# Patient Record
Sex: FEMALE | Race: NATIVE HAWAIIAN OR OTHER PACIFIC ISLANDER | NOT HISPANIC OR LATINO | URBAN - METROPOLITAN AREA
[De-identification: names, ages, dates, MRNs, and addresses within clinical notes are randomized per-mention and may not be internally consistent; named-entity substitution may affect disease eponyms.]

---

## 2018-08-29 ENCOUNTER — INPATIENT (INPATIENT)
Facility: HOSPITAL | Age: 68
LOS: 5 days | Discharge: HOME HEALTH | DRG: 216 | End: 2018-09-04
Attending: THORACIC SURGERY (CARDIOTHORACIC VASCULAR SURGERY) | Admitting: THORACIC SURGERY (CARDIOTHORACIC VASCULAR SURGERY)
Payer: MEDICARE

## 2018-08-29 VITALS
DIASTOLIC BLOOD PRESSURE: 89 MMHG | SYSTOLIC BLOOD PRESSURE: 137 MMHG | OXYGEN SATURATION: 96 % | TEMPERATURE: 98 F | RESPIRATION RATE: 22 BRPM | WEIGHT: 117.95 LBS | HEART RATE: 98 BPM

## 2018-08-29 DIAGNOSIS — I34.0 NONRHEUMATIC MITRAL (VALVE) INSUFFICIENCY: ICD-10-CM

## 2018-08-29 LAB
ALBUMIN SERPL ELPH-MCNC: 3.8 G/DL — SIGNIFICANT CHANGE UP (ref 3.3–5)
ALBUMIN SERPL ELPH-MCNC: 4.1 G/DL — SIGNIFICANT CHANGE UP (ref 3.3–5)
ALP SERPL-CCNC: 70 U/L — SIGNIFICANT CHANGE UP (ref 40–120)
ALP SERPL-CCNC: 79 U/L — SIGNIFICANT CHANGE UP (ref 40–120)
ALT FLD-CCNC: 109 U/L — HIGH (ref 10–45)
ALT FLD-CCNC: 125 U/L — HIGH (ref 10–45)
ANION GAP SERPL CALC-SCNC: 15 MMOL/L — SIGNIFICANT CHANGE UP (ref 5–17)
ANION GAP SERPL CALC-SCNC: 17 MMOL/L — SIGNIFICANT CHANGE UP (ref 5–17)
APTT BLD: 33.6 SEC — SIGNIFICANT CHANGE UP (ref 27.5–37.4)
APTT BLD: 34.3 SEC — SIGNIFICANT CHANGE UP (ref 27.5–37.4)
AST SERPL-CCNC: 59 U/L — HIGH (ref 10–40)
AST SERPL-CCNC: 74 U/L — HIGH (ref 10–40)
BASOPHILS NFR BLD AUTO: 0.1 % — SIGNIFICANT CHANGE UP (ref 0–2)
BILIRUB SERPL-MCNC: 1.8 MG/DL — HIGH (ref 0.2–1.2)
BILIRUB SERPL-MCNC: 2 MG/DL — HIGH (ref 0.2–1.2)
BLD GP AB SCN SERPL QL: NEGATIVE — SIGNIFICANT CHANGE UP
BUN SERPL-MCNC: 12 MG/DL — SIGNIFICANT CHANGE UP (ref 7–23)
BUN SERPL-MCNC: 14 MG/DL — SIGNIFICANT CHANGE UP (ref 7–23)
CALCIUM SERPL-MCNC: 8.6 MG/DL — SIGNIFICANT CHANGE UP (ref 8.4–10.5)
CALCIUM SERPL-MCNC: 8.6 MG/DL — SIGNIFICANT CHANGE UP (ref 8.4–10.5)
CHLORIDE SERPL-SCNC: 104 MMOL/L — SIGNIFICANT CHANGE UP (ref 96–108)
CHLORIDE SERPL-SCNC: 104 MMOL/L — SIGNIFICANT CHANGE UP (ref 96–108)
CHOLEST SERPL-MCNC: 180 MG/DL — SIGNIFICANT CHANGE UP (ref 10–199)
CO2 SERPL-SCNC: 20 MMOL/L — LOW (ref 22–31)
CO2 SERPL-SCNC: 21 MMOL/L — LOW (ref 22–31)
CREAT SERPL-MCNC: 0.64 MG/DL — SIGNIFICANT CHANGE UP (ref 0.5–1.3)
CREAT SERPL-MCNC: 0.66 MG/DL — SIGNIFICANT CHANGE UP (ref 0.5–1.3)
GAS PNL BLDA: SIGNIFICANT CHANGE UP
GAS PNL BLDA: SIGNIFICANT CHANGE UP
GLUCOSE SERPL-MCNC: 129 MG/DL — HIGH (ref 70–99)
GLUCOSE SERPL-MCNC: 163 MG/DL — HIGH (ref 70–99)
HBA1C BLD-MCNC: 5.4 % — SIGNIFICANT CHANGE UP (ref 4–5.6)
HCT VFR BLD CALC: 35.3 % — SIGNIFICANT CHANGE UP (ref 34.5–45)
HCT VFR BLD CALC: 37.2 % — SIGNIFICANT CHANGE UP (ref 34.5–45)
HDLC SERPL-MCNC: 50 MG/DL — SIGNIFICANT CHANGE UP
HGB BLD-MCNC: 11.4 G/DL — LOW (ref 11.5–15.5)
HGB BLD-MCNC: 11.8 G/DL — SIGNIFICANT CHANGE UP (ref 11.5–15.5)
INR BLD: 1.12 — SIGNIFICANT CHANGE UP (ref 0.88–1.16)
INR BLD: 1.17 — HIGH (ref 0.88–1.16)
LACTATE SERPL-SCNC: 1.2 MMOL/L — SIGNIFICANT CHANGE UP (ref 0.5–2)
LIPID PNL WITH DIRECT LDL SERPL: 109 MG/DL — SIGNIFICANT CHANGE UP
LYMPHOCYTES # BLD AUTO: 8.3 % — LOW (ref 13–44)
MAGNESIUM SERPL-MCNC: 1.8 MG/DL — SIGNIFICANT CHANGE UP (ref 1.6–2.6)
MCHC RBC-ENTMCNC: 22.6 PG — LOW (ref 27–34)
MCHC RBC-ENTMCNC: 23 PG — LOW (ref 27–34)
MCHC RBC-ENTMCNC: 31.7 G/DL — LOW (ref 32–36)
MCHC RBC-ENTMCNC: 32.3 G/DL — SIGNIFICANT CHANGE UP (ref 32–36)
MCV RBC AUTO: 71.3 FL — LOW (ref 80–100)
MCV RBC AUTO: 71.3 FL — LOW (ref 80–100)
MONOCYTES NFR BLD AUTO: 2.3 % — SIGNIFICANT CHANGE UP (ref 2–14)
NEUTROPHILS NFR BLD AUTO: 89.2 % — HIGH (ref 43–77)
PHOSPHATE SERPL-MCNC: 4.1 MG/DL — SIGNIFICANT CHANGE UP (ref 2.5–4.5)
PLATELET # BLD AUTO: 134 K/UL — LOW (ref 150–400)
PLATELET # BLD AUTO: 195 K/UL — SIGNIFICANT CHANGE UP (ref 150–400)
POTASSIUM SERPL-MCNC: 3.6 MMOL/L — SIGNIFICANT CHANGE UP (ref 3.5–5.3)
POTASSIUM SERPL-MCNC: 4.8 MMOL/L — SIGNIFICANT CHANGE UP (ref 3.5–5.3)
POTASSIUM SERPL-SCNC: 3.6 MMOL/L — SIGNIFICANT CHANGE UP (ref 3.5–5.3)
POTASSIUM SERPL-SCNC: 4.8 MMOL/L — SIGNIFICANT CHANGE UP (ref 3.5–5.3)
PROT SERPL-MCNC: 6.4 G/DL — SIGNIFICANT CHANGE UP (ref 6–8.3)
PROT SERPL-MCNC: 7 G/DL — SIGNIFICANT CHANGE UP (ref 6–8.3)
PROTHROM AB SERPL-ACNC: 12.5 SEC — SIGNIFICANT CHANGE UP (ref 9.8–12.7)
PROTHROM AB SERPL-ACNC: 13 SEC — HIGH (ref 9.8–12.7)
RBC # BLD: 4.95 M/UL — SIGNIFICANT CHANGE UP (ref 3.8–5.2)
RBC # BLD: 5.22 M/UL — HIGH (ref 3.8–5.2)
RBC # FLD: 16.9 % — SIGNIFICANT CHANGE UP (ref 10.3–16.9)
RBC # FLD: 17.1 % — HIGH (ref 10.3–16.9)
RH IG SCN BLD-IMP: POSITIVE — SIGNIFICANT CHANGE UP
SODIUM SERPL-SCNC: 140 MMOL/L — SIGNIFICANT CHANGE UP (ref 135–145)
SODIUM SERPL-SCNC: 141 MMOL/L — SIGNIFICANT CHANGE UP (ref 135–145)
TOTAL CHOLESTEROL/HDL RATIO MEASUREMENT: 3.6 RATIO — SIGNIFICANT CHANGE UP (ref 3.3–7.1)
TRIGL SERPL-MCNC: 106 MG/DL — SIGNIFICANT CHANGE UP (ref 10–149)
WBC # BLD: 9.2 K/UL — SIGNIFICANT CHANGE UP (ref 3.8–10.5)
WBC # BLD: 9.4 K/UL — SIGNIFICANT CHANGE UP (ref 3.8–10.5)
WBC # FLD AUTO: 9.2 K/UL — SIGNIFICANT CHANGE UP (ref 3.8–10.5)
WBC # FLD AUTO: 9.4 K/UL — SIGNIFICANT CHANGE UP (ref 3.8–10.5)

## 2018-08-29 PROCEDURE — 93458 L HRT ARTERY/VENTRICLE ANGIO: CPT | Mod: 26

## 2018-08-29 PROCEDURE — 99285 EMERGENCY DEPT VISIT HI MDM: CPT

## 2018-08-29 PROCEDURE — 99291 CRITICAL CARE FIRST HOUR: CPT

## 2018-08-29 PROCEDURE — 36556 INSERT NON-TUNNEL CV CATH: CPT

## 2018-08-29 PROCEDURE — 36620 INSERTION CATHETER ARTERY: CPT

## 2018-08-29 PROCEDURE — 99292 CRITICAL CARE ADDL 30 MIN: CPT

## 2018-08-29 PROCEDURE — 71045 X-RAY EXAM CHEST 1 VIEW: CPT | Mod: 26,76

## 2018-08-29 PROCEDURE — 76937 US GUIDE VASCULAR ACCESS: CPT | Mod: 26

## 2018-08-29 PROCEDURE — 93306 TTE W/DOPPLER COMPLETE: CPT | Mod: 26

## 2018-08-29 RX ORDER — METOPROLOL TARTRATE 50 MG
0 TABLET ORAL
Qty: 0 | Refills: 0 | COMMUNITY

## 2018-08-29 RX ORDER — CHLORHEXIDINE GLUCONATE 213 G/1000ML
1 SOLUTION TOPICAL DAILY
Qty: 0 | Refills: 0 | Status: DISCONTINUED | OUTPATIENT
Start: 2018-08-29 | End: 2018-08-31

## 2018-08-29 RX ORDER — INSULIN HUMAN 100 [IU]/ML
2 INJECTION, SOLUTION SUBCUTANEOUS
Qty: 100 | Refills: 0 | Status: DISCONTINUED | OUTPATIENT
Start: 2018-08-29 | End: 2018-08-31

## 2018-08-29 RX ORDER — POTASSIUM CHLORIDE 20 MEQ
20 PACKET (EA) ORAL ONCE
Qty: 0 | Refills: 0 | Status: DISCONTINUED | OUTPATIENT
Start: 2018-08-29 | End: 2018-08-29

## 2018-08-29 RX ORDER — CHLORHEXIDINE GLUCONATE 213 G/1000ML
1 SOLUTION TOPICAL ONCE
Qty: 0 | Refills: 0 | Status: COMPLETED | OUTPATIENT
Start: 2018-08-30 | End: 2018-08-30

## 2018-08-29 RX ORDER — MIDAZOLAM HYDROCHLORIDE 1 MG/ML
1 INJECTION, SOLUTION INTRAMUSCULAR; INTRAVENOUS ONCE
Qty: 0 | Refills: 0 | Status: DISCONTINUED | OUTPATIENT
Start: 2018-08-29 | End: 2018-08-29

## 2018-08-29 RX ORDER — HEPARIN SODIUM 5000 [USP'U]/ML
5000 INJECTION INTRAVENOUS; SUBCUTANEOUS EVERY 8 HOURS
Qty: 0 | Refills: 0 | Status: DISCONTINUED | OUTPATIENT
Start: 2018-08-29 | End: 2018-08-31

## 2018-08-29 RX ORDER — CHLORHEXIDINE GLUCONATE 213 G/1000ML
10 SOLUTION TOPICAL ONCE
Qty: 0 | Refills: 0 | Status: COMPLETED | OUTPATIENT
Start: 2018-08-29 | End: 2018-08-30

## 2018-08-29 RX ORDER — SODIUM CHLORIDE 9 MG/ML
1000 INJECTION INTRAMUSCULAR; INTRAVENOUS; SUBCUTANEOUS
Qty: 0 | Refills: 0 | Status: DISCONTINUED | OUTPATIENT
Start: 2018-08-29 | End: 2018-08-31

## 2018-08-29 RX ORDER — CHLORHEXIDINE GLUCONATE 213 G/1000ML
1 SOLUTION TOPICAL ONCE
Qty: 0 | Refills: 0 | Status: COMPLETED | OUTPATIENT
Start: 2018-08-29 | End: 2018-08-30

## 2018-08-29 RX ORDER — POTASSIUM CHLORIDE 20 MEQ
20 PACKET (EA) ORAL
Qty: 0 | Refills: 0 | Status: COMPLETED | OUTPATIENT
Start: 2018-08-29 | End: 2018-08-29

## 2018-08-29 RX ORDER — FAMOTIDINE 10 MG/ML
20 INJECTION INTRAVENOUS EVERY 12 HOURS
Qty: 0 | Refills: 0 | Status: DISCONTINUED | OUTPATIENT
Start: 2018-08-29 | End: 2018-08-31

## 2018-08-29 RX ORDER — CHLORHEXIDINE GLUCONATE 213 G/1000ML
1 SOLUTION TOPICAL ONCE
Qty: 0 | Refills: 0 | Status: COMPLETED | OUTPATIENT
Start: 2018-08-29 | End: 2018-08-29

## 2018-08-29 RX ORDER — FUROSEMIDE 40 MG
40 TABLET ORAL ONCE
Qty: 0 | Refills: 0 | Status: COMPLETED | OUTPATIENT
Start: 2018-08-29 | End: 2018-08-29

## 2018-08-29 RX ADMIN — FAMOTIDINE 20 MILLIGRAM(S): 10 INJECTION INTRAVENOUS at 18:46

## 2018-08-29 RX ADMIN — CHLORHEXIDINE GLUCONATE 1 APPLICATION(S): 213 SOLUTION TOPICAL at 23:06

## 2018-08-29 RX ADMIN — HEPARIN SODIUM 5000 UNIT(S): 5000 INJECTION INTRAVENOUS; SUBCUTANEOUS at 22:13

## 2018-08-29 RX ADMIN — Medication 100 MILLIEQUIVALENT(S): at 18:47

## 2018-08-29 RX ADMIN — Medication 100 MILLIEQUIVALENT(S): at 19:59

## 2018-08-29 RX ADMIN — MIDAZOLAM HYDROCHLORIDE 1 MILLIGRAM(S): 1 INJECTION, SOLUTION INTRAMUSCULAR; INTRAVENOUS at 15:50

## 2018-08-29 RX ADMIN — Medication 40 MILLIGRAM(S): at 14:23

## 2018-08-29 NOTE — ED ADULT TRIAGE NOTE - CHIEF COMPLAINT QUOTE
Pt c/o shortness of breath and chest pain for the past week, pt sent in by Dr. Germania Yeung for pneumonia and "mitral valve rupture".

## 2018-08-29 NOTE — ED PROVIDER NOTE - OBJECTIVE STATEMENT
68 year old female with recently diagnosed PNA and history of HTN presents to ED with concern for shortness of breath and worsening respiratory symptoms.  Patient notes she was sent to ED by her PCP, Dr. Germania Yeung for admission.  Patient notes cough productive of sputum and chest wall discomfort, however denies fever, chills, nausea, vomiting, abdominal pain, changes to bowel movements or any additional acute complaints or concerns at this time.

## 2018-08-29 NOTE — PROCEDURE NOTE - NSPROCDETAILS_GEN_ALL_CORE
sterile dressing applied/ultrasound guidance/guidewire recovered/sterile technique, catheter placed/lumen(s) aspirated and flushed
positive blood return obtained via catheter/sutured in place/location identified, draped/prepped, sterile technique used, needle inserted/introduced/Seldinger technique/connected to a pressurized flush line/all materials/supplies accounted for at end of procedure

## 2018-08-29 NOTE — H&P ADULT - NSHPREVIEWOFSYSTEMS_GEN_ALL_CORE
Review of Systems  CONSTITUTIONAL:  Denies Fevers / chills, sweats, fatigue, weight loss, weight gain                                      NEURO:  Denies parethesias, seizures, syncope, confusion                                                                                EYES:  Denies Blurry vision, discharge, pain, loss of vision                                                                                    ENMT:  Denies Difficulty hearing, vertigo, dysphagia, epistaxis, recent dental work                                       CV:  +Chest pain, +SOB/VARNER, +orthopnea.  Denies palpitations                                                                                        RESPIRATORY:  Denies Wheezing, cough / sputum, hemoptysis                                                                GI:  Denies Nausea, vomiting, diarrhea, constipation, melena, difficulty swallowing                                               : Denies Hematuria, dysuria, urgency, incontinence                                                                                         MUSCULOSKELETAL:  Denies arthritis, joint swelling, muscle weakness                                                             SKIN/BREAST:  Denies rash, itching, hair loss, masses                                                                                            PSYCH:  Denies depression, anxiety, suicidal ideation                                                                                               HEME/LYMPH:  Denies bruises easily, enlarged lymph nodes, tender lymph nodes                                        ENDOCRINE:  Denies cold intolerance, heat intolerance, polydipsia  +Poor appetite

## 2018-08-29 NOTE — PROGRESS NOTE ADULT - SUBJECTIVE AND OBJECTIVE BOX
Interventional Cardiology PA Precath Note    HPI:    67 y/o female, Cantonese speaking (accompanied by her son who speaks English and her ) with PMHx HTN, PNA (once per year over the past 3-4 years) and anemia who presented to the ED 8/29 AM with complaints of chest pain and SOB.  She started having CP/SOB intermittently over the past week, worse with exertion, NYHA class III symptoms, however she is now having symptoms at rest, NYHA class IV.  Her chest pain was substernal, non-radiating, intermittent, self-limiting and severe in nature.  She also had loss of appetite, eating only a small amount of porridge and sips of water each day, with "little" urine and no BM in a few days.  Her family "talked her into" seeing her PCP yesterday, and they thought she might have a PNA due to her CXR findings.  They also sent her to a Cardiologist Dr. Smith who performed a TTE revealing "mitral valve prolapse".  She was told to go to the ED, but she refused and went home.  Today her family convinced her to come to the ED since her CP/SOB got worse.  In the ED, her BP was stable at 130/70, HR in the 90's NSR and O2 96-98% on room air.  The ED team gave her ~1L of fluid given her recent poor appetite.  They called us to evaluate her for r/o PNA and MV disease.  Upon seeing her, she complained of overall weakness and "pain all over" with SOB.  She also has new LE edema over the past week.  She has low back pain, but states this is chronic and intermittent.  She last ate today at 9am, small amt of porridge.  Anginal class 1, 2, 3, 4    Radiology:  X-ray:  CT Scan:  MRI:  Ultrasound/ECHO  Stress test:  Prior Cath Hx:    PMH:    PSH:    ALL: NKDA, NKFA. Denies shellfish/Contrast dye allergy.  SocHX: Denies EtoH/TOB/IVDU  FHx:   MEDS:   chlorhexidine 0.12% Liquid 10 milliLiter(s) Swish and Spit once  chlorhexidine 2% Cloths 1 Application(s) Topical daily  chlorhexidine 4% Liquid 1 Application(s) Topical once  chlorhexidine 4% Liquid 1 Application(s) Topical once  famotidine Injectable 20 milliGRAM(s) IV Push every 12 hours  furosemide   Injectable 40 milliGRAM(s) IV Push once  heparin  Injectable 5000 Unit(s) SubCutaneous every 8 hours  midazolam Injectable 1 milliGRAM(s) IV Push once  sodium chloride 0.9%. 1000 milliLiter(s) IV Continuous <Continuous>    T(C): 36.7 (08-29-18 @ 13:43), Max: 36.9 (08-29-18 @ 11:03)  HR: 95 (08-29-18 @ 13:43) (91 - 98)  BP: 130/63 (08-29-18 @ 13:43) (130/63 - 137/89)  RR: 20 (08-29-18 @ 13:43) (20 - 22)  SpO2: 100% (08-29-18 @ 13:43) (96% - 100%)  Wt(kg): --  HEENT: NCAT, EOMI, PERRLA  NECK: No JVD, No carotid bruits B/L, +2 Carotid pulses B/L  PULM:  CTA B/L No W/R/R  CARD: RRR, +S1, +S2, No M/R/G  ABD: ND, +BS, NT, no masses  EXT: Warm, pedal edema yes/no, pitting/non-pitting  RECTAL: Guaiac negative/positive   NEURO: A & O x 3, no focal neurologic deficits  PULSES:	B	    R	      FEM         	                                            DP                          PT  Right		                                                  Yes/No     Bruit	    Left		                                                  Yes/No     Bruit                                11.6   7.1   )-----------( 173      ( 29 Aug 2018 11:50 )             36.8     08-29    140  |  103  |  17  ----------------------------<  234<H>  4.3   |  22  |  0.83    Ca    9.0      29 Aug 2018 11:50    TPro  6.5  /  Alb  4.0  /  TBili  2.1<H>  /  DBili  x   /  AST  73<H>  /  ALT  122<H>  /  AlkPhos  74  08-29    CARDIAC MARKERS ( 29 Aug 2018 11:50 )  x     / <0.01 ng/mL / x     / x     / x            EKG:					  ASA _____				Mallampati class: _________	            Anginal Class: _________  A/P:        Sedation Plan:   ? None   ? Moderate    ?  Deep    ?  General Anesthesia   Patient Is Suitable Candidate For Sedation?     ? Yes   ? No   ? Not Applicable     Risks & benefits of procedure and sedation and risks and benefits for the alternative therapy have been explained to the patient in layman’s terms including but not limited to: allergic reaction, bleeding, infection, arrhythmia, respiratory compromise, renal and vascular compromise, limb damage, MI, CVA, emergent CABG/Vascular Surgery and death. Informed consent obtained and in chart. Interventional Cardiology PA Precath Note    HPI:    67 y/o female, Cantonese speaking (accompanied by her son who speaks English and her ) with PMHx HTN, PNA (once per year over the past 3-4 years) and anemia who presented to the ED 8/29 AM with complaints of chest pain and SOB.  She started having CP/SOB intermittently over the past week, worse with exertion, NYHA class III symptoms, however she is now having symptoms at rest, NYHA class IV.  Her chest pain was substernal, non-radiating, intermittent, self-limiting and severe in nature.  Her family "talked her into" seeing her PCP yesterday, and they thought she might have a PNA due to her CXR findings.  They also sent her to a Cardiologist Dr. Smith who performed a TTE revealing "mitral valve prolapse".  She was told to go to the ED, but she refused and went home.  Today her family convinced her to come to the ED since her CP/SOB got worse.  In the ED VSS stable.  The ED team gave her ~1L of fluid given her recent poor appetite. CTS team called to evaluate her for r/o PNA and MV disease.  Echocardiogram revealing mitral valve w/ flail posterior leaflet with an eccentric anterior mitral jet severe in nature.  Patient admitted to CTICU for pulmonary edema in the setting of Severe mitral prolapse w/ plan for MVR w/ Dr. Raya on 8/30.  She is NPO today for Diagnostic LHC w/ Dr. Oconnell.  Given patients     Radiology:  X-ray:  CT Scan:  MRI:  Ultrasound/ECHO  Stress test:  Prior Cath Hx:    PMH:    PSH:    ALL: NKDA, NKFA. Denies shellfish/Contrast dye allergy.  SocHX: Denies EtoH/TOB/IVDU  FHx:   MEDS:   chlorhexidine 0.12% Liquid 10 milliLiter(s) Swish and Spit once  chlorhexidine 2% Cloths 1 Application(s) Topical daily  chlorhexidine 4% Liquid 1 Application(s) Topical once  chlorhexidine 4% Liquid 1 Application(s) Topical once  famotidine Injectable 20 milliGRAM(s) IV Push every 12 hours  furosemide   Injectable 40 milliGRAM(s) IV Push once  heparin  Injectable 5000 Unit(s) SubCutaneous every 8 hours  midazolam Injectable 1 milliGRAM(s) IV Push once  sodium chloride 0.9%. 1000 milliLiter(s) IV Continuous <Continuous>    T(C): 36.7 (08-29-18 @ 13:43), Max: 36.9 (08-29-18 @ 11:03)  HR: 95 (08-29-18 @ 13:43) (91 - 98)  BP: 130/63 (08-29-18 @ 13:43) (130/63 - 137/89)  RR: 20 (08-29-18 @ 13:43) (20 - 22)  SpO2: 100% (08-29-18 @ 13:43) (96% - 100%)  Wt(kg): --  HEENT: NCAT, EOMI, PERRLA  NECK: No JVD, No carotid bruits B/L, +2 Carotid pulses B/L  PULM:  CTA B/L No W/R/R  CARD: RRR, +S1, +S2, No M/R/G  ABD: ND, +BS, NT, no masses  EXT: Warm, pedal edema yes/no, pitting/non-pitting  RECTAL: Guaiac negative/positive   NEURO: A & O x 3, no focal neurologic deficits  PULSES:	B	    R	      FEM         	                                            DP                          PT  Right		                                                  Yes/No     Bruit	    Left		                                                  Yes/No     Bruit                                11.6   7.1   )-----------( 173      ( 29 Aug 2018 11:50 )             36.8     08-29    140  |  103  |  17  ----------------------------<  234<H>  4.3   |  22  |  0.83    Ca    9.0      29 Aug 2018 11:50    TPro  6.5  /  Alb  4.0  /  TBili  2.1<H>  /  DBili  x   /  AST  73<H>  /  ALT  122<H>  /  AlkPhos  74  08-29    CARDIAC MARKERS ( 29 Aug 2018 11:50 )  x     / <0.01 ng/mL / x     / x     / x            EKG:					  ASA _____				Mallampati class: _________	            Anginal Class: _________  A/P:        Sedation Plan:   ? None   ? Moderate    ?  Deep    ?  General Anesthesia   Patient Is Suitable Candidate For Sedation?     ? Yes   ? No   ? Not Applicable     Risks & benefits of procedure and sedation and risks and benefits for the alternative therapy have been explained to the patient in layman’s terms including but not limited to: allergic reaction, bleeding, infection, arrhythmia, respiratory compromise, renal and vascular compromise, limb damage, MI, CVA, emergent CABG/Vascular Surgery and death. Informed consent obtained and in chart. Interventional Cardiology PA Precath Note    HPI:    67 y/o female, Cantonese speaking (accompanied by her son who speaks English and her ) with PMHx HTN, PNA (once per year over the past 3-4 years) and anemia who presented to the ED 8/29 AM with complaints of chest pain and SOB.  She started having CP/SOB intermittently over the past week, worse with exertion, NYHA class III symptoms, however she is now having symptoms at rest, 3 pillow orthopnea, NYHA class IV.  Her chest pain was substernal, non-radiating, intermittent, self-limiting and severe in nature.  Her family "talked her into" seeing her PCP yesterday, and they thought she might have a PNA due to her CXR findings.  They also sent her to a Cardiologist Dr. Smith who performed a TTE revealing "mitral valve prolapse".  She was told to go to the ED, but she refused and went home.  Today her family convinced her to come to the ED since her CP/SOB got worse.  In the ED VSS stable.  The ED team gave her ~1L of fluid given her recent poor appetite. CTS team called to evaluate her for r/o PNA and MV disease.  Echocardiogram revealing EF >70%, mitral valve w/ flail posterior leaflet with an eccentric anterior mitral jet severe in nature.  Patient admitted to CTICU for pulmonary edema in the setting of Severe mitral prolapse w/ plan for MVR w/ Dr. Raya on 8/30.  She is NPO today for Diagnostic LHC w/ Dr. Oconnell.  Given patients respiratory distress and inability to lay flat she will be electively intubated prior to cardiac cath.        ALL: NKDA, NKFA. Denies shellfish/Contrast dye allergy.    MEDS:   chlorhexidine 0.12% Liquid 10 milliLiter(s) Swish and Spit once  chlorhexidine 2% Cloths 1 Application(s) Topical daily  chlorhexidine 4% Liquid 1 Application(s) Topical once  chlorhexidine 4% Liquid 1 Application(s) Topical once  famotidine Injectable 20 milliGRAM(s) IV Push every 12 hours  furosemide   Injectable 40 milliGRAM(s) IV Push once  heparin  Injectable 5000 Unit(s) SubCutaneous every 8 hours  midazolam Injectable 1 milliGRAM(s) IV Push once  sodium chloride 0.9%. 1000 milliLiter(s) IV Continuous <Continuous>    T(C): 36.7 (08-29-18 @ 13:43), Max: 36.9 (08-29-18 @ 11:03)  HR: 95 (08-29-18 @ 13:43) (91 - 98)  BP: 130/63 (08-29-18 @ 13:43) (130/63 - 137/89)  RR: 20 (08-29-18 @ 13:43) (20 - 22)  SpO2: 100% (08-29-18 @ 13:43) (96% - 100%)    HEENT: NCAT, EOMI, PERRLA  NECK: + JVD  PULM:  b/l crackles at lung bases.  CARD: RRR, 5/6 systolic murmur  ABD: ND, +BS, NT, no masses  EXT: cool, 2+ pitting edema    NEURO: A & O x 3, no focal neurologic deficits  PULSES:	B	    R	      FEM              DP                          PT  Right	2+	   2+             2+              	2+                 2+  Left	2+	  2+              2+                2+                        2+                        11.6   7.1   )-----------( 173      ( 29 Aug 2018 11:50 )             36.8     08-29    140  |  103  |  17  ----------------------------<  234<H>  4.3   |  22  |  0.83    Ca    9.0      29 Aug 2018 11:50    TPro  6.5  /  Alb  4.0  /  TBili  2.1<H>  /  DBili  x   /  AST  73<H>  /  ALT  122<H>  /  AlkPhos  74  08-29    CARDIAC MARKERS ( 29 Aug 2018 11:50 )  x     / <0.01 ng/mL / x     / x     / x        EKG: Sinus tachycardia, non specific T wave changes				  ASA __III___				Mallampati class: __III_______	            Anginal Class: __IV_______    A/P:    67 y/o female, Cantonese speaking (accompanied by her son who speaks English and her ) with PMHx HTN, PNA (once per year over the past 3-4 years), anemia, recent Echo revealing mitral valve prolapse, who was referred to [facility] ED by the cardiologist with c/o CP and SOB x1 week in the setting of abnormal Echo now admitted for pulmonary edema in the setting of Sever MR with plan for MVR w/ Dr. Raya on 8/30.  Patient NPO for Diagnostic LHC as part of the pre operative work up.  Given respiratory distress patient electively intubated prior to procedure.     - no loading as pre operative.   - Consent obtained via Cantonese  prior to intubation.     Sedation Plan:  General Anesthesia   Patient Is Suitable Candidate For Sedation?Yes      Risks & benefits of procedure and sedation and risks and benefits for the alternative therapy have been explained to the patient in layman’s terms including but not limited to: allergic reaction, bleeding, infection, arrhythmia, respiratory compromise, renal and vascular compromise, limb damage, MI, CVA, emergent CABG/Vascular Surgery and death. Informed consent obtained and in chart. Interventional Cardiology PA Precath Note    HPI:    67 y/o female, Cantonese speaking (accompanied by her son who speaks English and her ) with PMHx HTN, PNA (once per year over the past 3-4 years) and anemia who presented to the ED 8/29 AM with complaints of chest pain and SOB.  She started having CP/SOB intermittently over the past week, worse with exertion, NYHA class III symptoms, however she is now having symptoms at rest, 3 pillow orthopnea, NYHA class IV.  Her chest pain was substernal, non-radiating, intermittent, self-limiting and severe in nature.  Her family "talked her into" seeing her PCP yesterday, and they thought she might have a PNA due to her CXR findings.  They also sent her to a Cardiologist Dr. Smith who performed a TTE revealing "mitral valve prolapse".  She was told to go to the ED, but she refused and went home.  Today her family convinced her to come to the ED since her CP/SOB got worse.  In the ED VSS stable.  The ED team gave her ~1L of fluid given her recent poor appetite. CTS team called to evaluate her for r/o PNA and MV disease.  Echocardiogram revealing EF >70%, mitral valve w/ flail posterior leaflet with an eccentric anterior mitral jet severe in nature.  Patient admitted to CTICU for pulmonary edema in the setting of Severe mitral prolapse w/ plan for MVR w/ Dr. Raya on 8/30.  She was started on IV Lasix and bailey placed.  She is NPO today for Diagnostic LHC w/ Dr. Oconnell.  Given patients respiratory distress and inability to lay flat she will be electively intubated prior to cardiac cath.        ALL: NKDA, NKFA. Denies shellfish/Contrast dye allergy.    MEDS:   chlorhexidine 0.12% Liquid 10 milliLiter(s) Swish and Spit once  chlorhexidine 2% Cloths 1 Application(s) Topical daily  chlorhexidine 4% Liquid 1 Application(s) Topical once  chlorhexidine 4% Liquid 1 Application(s) Topical once  famotidine Injectable 20 milliGRAM(s) IV Push every 12 hours  furosemide   Injectable 40 milliGRAM(s) IV Push once  heparin  Injectable 5000 Unit(s) SubCutaneous every 8 hours  midazolam Injectable 1 milliGRAM(s) IV Push once  sodium chloride 0.9%. 1000 milliLiter(s) IV Continuous <Continuous>    T(C): 36.7 (08-29-18 @ 13:43), Max: 36.9 (08-29-18 @ 11:03)  HR: 95 (08-29-18 @ 13:43) (91 - 98)  BP: 130/63 (08-29-18 @ 13:43) (130/63 - 137/89)  RR: 20 (08-29-18 @ 13:43) (20 - 22)  SpO2: 100% (08-29-18 @ 13:43) (96% - 100%)    HEENT: NCAT, EOMI, PERRLA  NECK: + JVD  PULM:  b/l crackles at lung bases.  CARD: RRR, 5/6 systolic murmur  ABD: ND, +BS, NT, no masses  EXT: cool, 2+ pitting edema    NEURO: A & O x 3, no focal neurologic deficits  PULSES:	B	    R	      FEM              DP                          PT  Right	2+	   2+             2+              	2+                 2+  Left	2+	  2+              2+                2+                        2+                        11.6   7.1   )-----------( 173      ( 29 Aug 2018 11:50 )             36.8     08-29    140  |  103  |  17  ----------------------------<  234<H>  4.3   |  22  |  0.83    Ca    9.0      29 Aug 2018 11:50    TPro  6.5  /  Alb  4.0  /  TBili  2.1<H>  /  DBili  x   /  AST  73<H>  /  ALT  122<H>  /  AlkPhos  74  08-29    CARDIAC MARKERS ( 29 Aug 2018 11:50 )  x     / <0.01 ng/mL / x     / x     / x        EKG: Sinus tachycardia, non specific T wave changes				  ASA __III___				Mallampati class: __III_______	            Anginal Class: __IV_______    A/P:    67 y/o female, Cantonese speaking (accompanied by her son who speaks English and her ) with PMHx HTN, PNA (once per year over the past 3-4 years), anemia, recent Echo revealing mitral valve prolapse, who was referred to [facility] ED by the cardiologist with c/o CP and SOB x1 week in the setting of abnormal Echo now admitted for pulmonary edema in the setting of Sever MR with plan for MVR w/ Dr. Raya on 8/30.  Patient NPO for Diagnostic C as part of the pre operative work up.  Given respiratory distress patient electively intubated prior to procedure.     - no loading as pre operative.   - Consent obtained via Cantonese  prior to intubation.   - case discussed with Dr. Oconnell    Sedation Plan:  General Anesthesia   Patient Is Suitable Candidate For Sedation?Yes      Risks & benefits of procedure and sedation and risks and benefits for the alternative therapy have been explained to the patient in layman’s terms including but not limited to: allergic reaction, bleeding, infection, arrhythmia, respiratory compromise, renal and vascular compromise, limb damage, MI, CVA, emergent CABG/Vascular Surgery and death. Informed consent obtained and in chart. Interventional Cardiology PA Precath Note    HPI:    67 y/o female, Cantonese speaking (accompanied by her son who speaks English and her ) with PMHx HTN, PNA (once per year over the past 3-4 years) and anemia who presented to the ED 8/29 AM with complaints of chest pain and SOB.  She started having CP/SOB intermittently over the past week, worse with exertion, NYHA class III symptoms, however she is now having symptoms at rest, 3 pillow orthopnea, NYHA class IV.  Her chest pain was substernal, non-radiating, intermittent, self-limiting and severe in nature.  Her family "talked her into" seeing her PCP yesterday, and they thought she might have a PNA due to her CXR findings.  They also sent her to a Cardiologist Dr. Smith who performed a TTE revealing "mitral valve prolapse".  She was told to go to the ED, but she refused and went home.  Today her family convinced her to come to the ED since her CP/SOB got worse.  In the ED VSS stable.  The ED team gave her ~1L of fluid given her recent poor appetite. CTS team called to evaluate her for r/o PNA and MV disease.  Echocardiogram revealing EF >70%, mitral valve w/ flail posterior leaflet with an eccentric anterior mitral jet severe in nature.  Patient admitted to CTICU for pulmonary edema in the setting of Severe mitral prolapse w/ plan for MVR w/ Dr. Raya on 8/30.  She was started on IV Lasix and bailey placed.  She is NPO today for Diagnostic LHC w/ Dr. Oconnell.        ALL: NKDA, NKFA. Denies shellfish/Contrast dye allergy.    MEDS:   chlorhexidine 0.12% Liquid 10 milliLiter(s) Swish and Spit once  chlorhexidine 2% Cloths 1 Application(s) Topical daily  chlorhexidine 4% Liquid 1 Application(s) Topical once  chlorhexidine 4% Liquid 1 Application(s) Topical once  famotidine Injectable 20 milliGRAM(s) IV Push every 12 hours  furosemide   Injectable 40 milliGRAM(s) IV Push once  heparin  Injectable 5000 Unit(s) SubCutaneous every 8 hours  midazolam Injectable 1 milliGRAM(s) IV Push once  sodium chloride 0.9%. 1000 milliLiter(s) IV Continuous <Continuous>    T(C): 36.7 (08-29-18 @ 13:43), Max: 36.9 (08-29-18 @ 11:03)  HR: 95 (08-29-18 @ 13:43) (91 - 98)  BP: 130/63 (08-29-18 @ 13:43) (130/63 - 137/89)  RR: 20 (08-29-18 @ 13:43) (20 - 22)  SpO2: 100% (08-29-18 @ 13:43) (96% - 100%)    HEENT: NCAT, EOMI, PERRLA  NECK: + JVD  PULM:  b/l crackles at lung bases.  CARD: RRR, 5/6 systolic murmur  ABD: ND, +BS, NT, no masses  EXT: cool, 2+ pitting edema    NEURO: A & O x 3, no focal neurologic deficits  PULSES:	B	    R	      FEM              DP                          PT  Right	2+	   2+             2+              	2+                 2+  Left	2+	  2+              2+                2+                        2+                        11.6   7.1   )-----------( 173      ( 29 Aug 2018 11:50 )             36.8     08-29    140  |  103  |  17  ----------------------------<  234<H>  4.3   |  22  |  0.83    Ca    9.0      29 Aug 2018 11:50    TPro  6.5  /  Alb  4.0  /  TBili  2.1<H>  /  DBili  x   /  AST  73<H>  /  ALT  122<H>  /  AlkPhos  74  08-29    CARDIAC MARKERS ( 29 Aug 2018 11:50 )  x     / <0.01 ng/mL / x     / x     / x        EKG: Sinus tachycardia, non specific T wave changes				  ASA __III___				Mallampati class: __III_______	            Anginal Class: __IV_______    A/P:    67 y/o female, Cantonese speaking (accompanied by her son who speaks English and her ) with PMHx HTN, PNA (once per year over the past 3-4 years), anemia, recent Echo revealing mitral valve prolapse, who was referred to [facility] ED by the cardiologist with c/o CP and SOB x1 week in the setting of abnormal Echo now admitted for pulmonary edema in the setting of Sever MR with plan for MVR w/ Dr. Raya on 8/30.  Patient NPO for Diagnostic LHC as part of the pre operative work up.    - no loading as pre operative.   - Consent obtained via Cantonese   - given respiratory distress patient was potentially going to be intubated prior to the Cath but after receiving Lasix she improved and no plan for intubation at this time.   - case discussed with Dr. Oconnell    Sedation Plan:  moderate sedation  Patient Is Suitable Candidate For Sedation?Yes      Risks & benefits of procedure and sedation and risks and benefits for the alternative therapy have been explained to the patient in layman’s terms including but not limited to: allergic reaction, bleeding, infection, arrhythmia, respiratory compromise, renal and vascular compromise, limb damage, MI, CVA, emergent CABG/Vascular Surgery and death. Informed consent obtained and in chart.

## 2018-08-29 NOTE — ED ADULT NURSE NOTE - OBJECTIVE STATEMENT
Pt directed to ED from PCP CO Pneumonia and Mitral Valve Rupture, per family.  Son states "She's anemia and needs the lowest dose possible of all medications."  EKG obtained in Front triage.  Mask applied.  Pt CO weakness

## 2018-08-29 NOTE — PROCEDURE NOTE - PROCEDURE
<<-----Click on this checkbox to enter Procedure Arterial cannulation, percutaneous, for monitoring  08/29/2018    Active  JOHANNE

## 2018-08-29 NOTE — H&P ADULT - NSHPPHYSICALEXAM_GEN_ALL_CORE
GEN: Does not appear to be in respiratory distress, but looks very uncomfortable.  Slouched over in the stretcher, eyes closed.  Color looks a little off.  (slightly pale per family)  Psych: Unable to fully assess.   Neuro: A&Ox3.  No focal deficits based on gross exam.  Moving all extremities.   HEENT: No obvious abnormalities  CV: S1S2, regular, +loud holosystolic murmur heard throughout precordium, loudest at apex.  No carotid bruits.  No JVD  Lungs: Diffuse rhonchi B/L and rales at bases.  Scattered faint wheeze.   ABD: Soft, non-tender, non-distended. Hypoactive bowel sounds.   EXT: Warm and well perfused. 3+ pitting edema bilaterally up to knees. Arms looks slightly edematous as well, but family thinks this is her baseline.    Musculoskeletal: Not fully assessed.  No joint swelling  PV: Pedal pulses palpable

## 2018-08-29 NOTE — PROCEDURE NOTE - NSPOSTCAREGUIDE_GEN_A_CORE
Verbal/written post procedure instructions were given to patient/caregiver/Care for catheter as per unit/ICU protocols
Care for catheter as per unit/ICU protocols/Verbal/written post procedure instructions were given to patient/caregiver

## 2018-08-29 NOTE — H&P ADULT - NSHPLABSRESULTS_GEN_ALL_CORE
Xray: B/L pulmonary vascular congestion, with cephalization.  Official report pending.    EKG: NSR at 98 BPM.  Inverted in V1, V2.  No ST elevations or significant depressions.     < from: Echocardiogram (08.29.18 @ 13:46) >    The left atrium is severely dilated. The left atrial volume index is 70   cc/m2   (normal <34cc/m2)  There is mild concentric left ventricular   hypertrophy.Normal left ventricular size.The left ventricular wall   motion is   normal. The left ventricular ejection fraction is 70%.  Right atrial   size is   normal.The right ventricle is normal in size and function.There is   moderate   tricuspid regurgitation.Structurally normal pulmonic valve. There is mild   pulmonic regurgitation.The aortic valve is trileaflet. No aortic   regurgitation   noted.The pulmonary artery systolic pressure is estimated to be 86 mmHg.   There   is severe pulmonary hypertension.  The IVC is dilated (>2.1 cm) with an   abnormal inspiratory collapse (<50%) consistent with elevated right   atrial   pressure.  No aortic root dilatation.Normal size aortic arch with no   hemodynamic evidence for coarctation.A trivial pericardial effusion     < end of copied text >

## 2018-08-29 NOTE — ED PROVIDER NOTE - CARDIAC, MLM
Normal rate, regular rhythm.  Heart sounds S1, S2.  No murmurs, rubs or gallops. Normal rate, regular rhythm.  Heart sounds S1, S2.  + Murmur.

## 2018-08-29 NOTE — H&P ADULT - PROBLEM SELECTOR PLAN 1
Admit to 9E/ICU under Dr. Raya.    Plan for cardiac cath tonight as an   Will semi-electively intubate Admit to 9E/ICU under Dr. Raya.    Plan for cardiac cath tonight as an add-on; Cath team aware of urgency.  Keep NPO.    Will semi-electively intubate her due to her inability to lie flat.    Diurese with IV lasix.   BP/HR control as needed.  Will defer to ICU team/intensivist.    Patient's family was updated and aware of her needing surgery.  Mitral valve repair/replacement and TV repair/replacement tomorrow with Dr. Raya, 1st case.    Maintain active T&S.  Blood / blood products placed on hold.   Pre-op orders placed.  Consent to follow, pending cath.  ?Carotid dopplers if able to obtain, however getting the cath, intubating and placing arterial line/TLC are most important next steps at this time. Admit to 9E/ICU under Dr. Raya.    Plan for cardiac cath tonight as an add-on; Cath team aware of urgency.  Keep NPO.    Will semi-electively intubate her for the cath due to her inability to lie flat.    Diurese with IV lasix.   BP/HR control as needed.  Will defer to ICU team/intensivist.    Patient's family was updated and aware of her needing surgery.  Mitral valve repair/replacement and TV repair/replacement tomorrow with Dr. Raya, 1st case.    Maintain active T&S.  Blood / blood products placed on hold.   Pre-op orders placed.  Consent to follow, pending cath.  ?Carotid dopplers if able to obtain, however getting the cath, intubating and placing arterial line/TLC are most important next steps at this time.

## 2018-08-29 NOTE — H&P ADULT - HISTORY OF PRESENT ILLNESS
Patient is a 67 y/o female, Cantonese speaking accompanied by her son who speaks English and her ), with PMHx HTN, PNA (once per year over the past 3-4 years) and anemia who presented to the ED this morning with complaints of chest pain and SOB.  She started having CP/SOB intermittently over the past week, worse with exertion, NYHA class III symptoms, however she is now having symptoms at rest, NYHA class IV.  Her chest pain was substernal, non-radiating, intermittent, self-limiting and severe in nature.  She also had loss of appetite, eating only a small amount of porridge and sips of water each day, with "little" urine and no BM in a few days.  Her family "talked her into" seeing her PCP yesterday, and they thought she might have a PNA due to her CXR findings.  They also sent her to a Cardiologist Dr. Smith who performed a TTE revealing "mitral valve prolapse".  She was told to go to the ED, but she refused and went home.  Today her family convinced her to come to the ED since her CP/SOB got worse.  In the ED, her BP was stable at 130'70, HR in the 90's NSR and O2 96-98% on room air.  The ED team gave her ~1L of fluid given her recent poor appetite.  They called us to evaluate her for r/o PNA and MV disease.  Upon seeing her, she complained of overall weakness and "pain all over" with SOB.  She also has new LE edema over the past week.  She has low back pain, but states this is chronic and intermittent.  She last ate today at 9am, small amt of porridge.      *Of note, patient is an immigrant and does not know much about her family history.

## 2018-08-29 NOTE — ED PROVIDER NOTE - MEDICAL DECISION MAKING DETAILS
patient presents to ED with concern for SOB -worsening over several weeks.  Sent to ED by her PCP, Dr. Germania Yeung for admission to Dr. Scotty Hampton's service.  I spoke with Dr. Scotty Hampton as well as his PA, who request patient be admitted under Dr. Scotty Hampton and that echocardiogram and CT non con chest be ordered.  Orders are placed and patient's family at bedside aware of plan and agreeable.  Will admit at this time.

## 2018-08-29 NOTE — H&P ADULT - ASSESSMENT
67 y/o female with class IV CCS/NYHA symptoms.  Found to have severe MR with a flail P2, EF preserved, severe pHTN, moderate TR.  Dr. Raya reviewed echo and saw patient bedside.  She appears to be fluid overloaded, no signs or symptoms of PNA, SOB is most likely 2/2 her mitral valve disease.  Unclear where her chest pain is coming from, will have to evaluate her coronaries to r/o CAD.  She is awake and alert, relatively stable, but will need close observation and urgent cardiac work-up.

## 2018-08-29 NOTE — PROGRESS NOTE ADULT - SUBJECTIVE AND OBJECTIVE BOX
Planned Date of Surgery:       8/30/18                                                                                                           Surgeon:  Elver    Procedure: MVR +/- CABG, in cath lab now for L/RHC    HPI:  Patient is a 69 y/o female, Cantonese speaking accompanied by her son who speaks English and her ), with PMHx HTN, PNA (once per year over the past 3-4 years) and anemia who presented to the ED this morning with complaints of chest pain and SOB.  She started having CP/SOB intermittently over the past week, worse with exertion, NYHA class III symptoms, however she is now having symptoms at rest, NYHA class IV.  Her chest pain was substernal, non-radiating, intermittent, self-limiting and severe in nature.  She also had loss of appetite, eating only a small amount of porridge and sips of water each day, with "little" urine and no BM in a few days.  Her family "talked her into" seeing her PCP yesterday, and they thought she might have a PNA due to her CXR findings.  They also sent her to a Cardiologist Dr. Smith who performed a TTE revealing "mitral valve prolapse".  She was told to go to the ED, but she refused and went home.  Today her family convinced her to come to the ED since her CP/SOB got worse.  In the ED, her BP was stable at 130'70, HR in the 90's NSR and O2 96-98% on room air.  The ED team gave her ~1L of fluid given her recent poor appetite.  They called us to evaluate her for r/o PNA and MV disease.  Upon seeing her, she complained of overall weakness and "pain all over" with SOB.  She also has new LE edema over the past week.  She has low back pain, but states this is chronic and intermittent.  She last ate today at 9am, small amt of porridge.      PAST MEDICAL & SURGICAL HISTORY:  Mitral regurgitation  Anemia  HTN (hypertension)  No significant past surgical history      No Known Allergies      MEDICATIONS  (STANDING):  chlorhexidine 0.12% Liquid 10 milliLiter(s) Swish and Spit once  chlorhexidine 2% Cloths 1 Application(s) Topical daily  chlorhexidine 4% Liquid 1 Application(s) Topical once  chlorhexidine 4% Liquid 1 Application(s) Topical once  famotidine Injectable 20 milliGRAM(s) IV Push every 12 hours  heparin  Injectable 5000 Unit(s) SubCutaneous every 8 hours  insulin Infusion 2 Unit(s)/Hr (2 mL/Hr) IV Continuous <Continuous>  potassium chloride  20 mEq/100 mL IVPB 20 milliEquivalent(s) IV Intermittent every 1 hour  sodium chloride 0.9%. 1000 milliLiter(s) (10 mL/Hr) IV Continuous <Continuous>    MEDICATIONS  (PRN):      On Beta Blocker? No, acute CHF    Labs:                        11.8   9.4   )-----------( 195      ( 29 Aug 2018 15:05 )             37.2     08-29    141  |  104  |  14  ----------------------------<  163<H>  3.6   |  20<L>  |  0.66    Ca    8.6      29 Aug 2018 15:06    TPro  7.0  /  Alb  4.1  /  TBili  2.0<H>  /  DBili  x   /  AST  74<H>  /  ALT  125<H>  /  AlkPhos  79  08-29    PT/INR - ( 29 Aug 2018 15:06 )   PT: 12.5 sec;   INR: 1.12          PTT - ( 29 Aug 2018 15:06 )  PTT:34.3 sec    ABO Interpretation: O (08-29-18 @ 14:53)    ABG - ( 29 Aug 2018 14:50 )  pH, Arterial: 7.44  pH, Blood: x     /  pCO2: 32    /  pO2: 131   / HCO3: 21    / Base Excess: -2.1  /  SaO2: 99          CARDIAC MARKERS ( 29 Aug 2018 11:50 )  x     / <0.01 ng/mL / x     / x     / x        Hgb A1C: Pending    EKG: Pending	    CXR:  Pending    CT Scans: N/A    Cath Report: Pending     Echo: < from: Echocardiogram (08.29.18 @ 13:46) >  The left ventricular ejection fraction is 70%.  There is   moderate   tricuspid regurgitation.Structurally normal pulmonic valve. There is mild   pulmonic regurgitation.The aortic valve is trileaflet. No aortic   regurgitation   noted.The pulmonary artery systolic pressure is estimated to be 86 mmHg.   There   is severe pulmonary hypertension.  The IVC is dilated (>2.1 cm) with an   abnormal inspiratory collapse (<50%) consistent with elevated right   atrial   pressure.  No aortic root dilatation.Normal size aortic arch with no   hemodynamic evidence for coarctation.A trivial pericardial effusion   noted.Mitral Valve  Flail posterior mitral valve leaflet with an eccentric, anteriorly mitral   jet  that is severe in degree.    < end of copied text >      PFT's: Pending    Carotid Duplex: Pending    Consult in Chart?  YES  Consent in Chart? NO - awaiting results of L/RHC  Pre-op Orders Placed? YES  Blood Prodeucts Ordered? YES   NPO ordered? YES

## 2018-08-29 NOTE — PROGRESS NOTE ADULT - SUBJECTIVE AND OBJECTIVE BOX
INTERVAL HPI/OVERNIGHT EVENTS:    PreOp  EF 70%    67yo Cantonese female with Hx HTN, anemia presenting with CP/SOB/VARNER    seen by PMD after significant prompting by family - felt PNA with etiology of sxs and referred her to cardiology in light of CP (Dr Smiht)    ECHO: "mitral valve prolapse"/mitral regurgitation by verbal report - told to go to ER and refused to go  today - sxs progressed and worsened - patient convinced her to come to ER -   given approx 750cc IVF in ER in light of reported poor po intake    Patient reporting sxs generalized weakness/fatigue and "pain all over" with increase in bilateral LE edema.     ECHO 8/29: EF 70%.  Normal RV size/fxn; mod TR; mild [place]. pulmonary artery systolic pressure 86 mmHg.   severe pulmonary hypertension    CTS consulted and admitted to ICU  on arrival - cachectic/fatigued - on nasal cannula  bailey placed; lasix 40mg IV given with 1300cc UO to date    patient informed via official  of planned cath today and OR 8/30      PMHx includes but is not limited to:   Mitral regurgitation  Anemia  HTN       ICU Vital Signs Last 24 Hrs  T(C): 36 (29 Aug 2018 14:09), Max: 36.9 (29 Aug 2018 11:03)  T(F): 96.8 (29 Aug 2018 14:09), Max: 98.4 (29 Aug 2018 11:03)  HR: 102 (29 Aug 2018 15:00) (91 - 102)  BP: 152/100 (29 Aug 2018 14:12) (130/63 - 152/100)  BP(mean): 119 (29 Aug 2018 14:12) (119 - 120)  ABP: 172/100 (29 Aug 2018 15:00) (172/100 - 172/100)  ABP(mean): 124 (29 Aug 2018 15:00) (124 - 124)  RR: 32 (29 Aug 2018 15:00) (20 - 32)  SpO2: 100% (29 Aug 2018 15:00) (96% - 100%)    Qtts:     I&O's Summary    29 Aug 2018 07:01  -  29 Aug 2018 15:57  --------------------------------------------------------  IN: 0 mL / OUT: 1200 mL / NET: -1200 mL            Physical Exam    Heart  Lungs  Abd  Ext  Chest  Neuro  Skin    LABS:                        11.8   9.4   )-----------( 195      ( 29 Aug 2018 15:05 )             37.2     08-29    141  |  104  |  14  ----------------------------<  163<H>  3.6   |  20<L>  |  0.66    Ca    8.6      29 Aug 2018 15:06    TPro  7.0  /  Alb  4.1  /  TBili  2.0<H>  /  DBili  x   /  AST  74<H>  /  ALT  125<H>  /  AlkPhos  79  08-29    PT/INR - ( 29 Aug 2018 15:06 )   PT: 12.5 sec;   INR: 1.12          PTT - ( 29 Aug 2018 15:06 )  PTT:34.3 sec    ABG - ( 29 Aug 2018 14:50 )  pH, Arterial: 7.44  pH, Blood: x     /  pCO2: 32    /  pO2: 131   / HCO3: 21    / Base Excess: -2.1  /  SaO2: 99                  RADIOLOGY & ADDITIONAL STUDIES:    I have spent/provided stated minutes of critical care time to this patient: INTERVAL HPI/OVERNIGHT EVENTS:    PreOp  EF 70%    69yo Cantonese female with Hx HTN, anemia presenting with CP/SOB/VARNER    seen by PMD after significant prompting by family - felt PNA with etiology of sxs and referred her to cardiology in light of CP (Dr Smith)    ECHO: "mitral valve prolapse"/mitral regurgitation by verbal report - told to go to ER and refused to go  today - sxs progressed and worsened - patient convinced her to come to ER -   given approx 750cc IVF in ER in light of reported poor po intake    Patient reporting sxs generalized weakness/fatigue and "pain all over" with increase in bilateral LE edema.     ECHO 8/29: EF 70%.  Normal RV size/fxn; mod TR; mild [place]. pulmonary artery systolic pressure 86 mmHg.   severe pulmonary hypertension    CTS consulted and admitted to ICU  on arrival - cachectic/fatigued - on nasal cannula  bailey placed; lasix 40mg IV given with 1300cc UO to date    patient informed via official  of planned cath today and OR 8/30  maynor and central line placed    PMHx includes but is not limited to:   Mitral regurgitation  Anemia  HTN     ICU Vital Signs Last 24 Hrs  T(C): 36 (29 Aug 2018 14:09), Max: 36.9 (29 Aug 2018 11:03)  T(F): 96.8 (29 Aug 2018 14:09), Max: 98.4 (29 Aug 2018 11:03)  HR: 102 (29 Aug 2018 15:00) (91 - 102)  BP: 152/100 (29 Aug 2018 14:12) (130/63 - 152/100)  BP(mean): 119 (29 Aug 2018 14:12) (119 - 120)  ABP: 172/100 (29 Aug 2018 15:00) (172/100 - 172/100)  ABP(mean): 124 (29 Aug 2018 15:00) (124 - 124)  RR: 32 (29 Aug 2018 15:00) (20 - 32)  SpO2: 100% (29 Aug 2018 15:00) (96% - 100%)    Qtts: None    I&O's Summary    29 Aug 2018 07:01  -  29 Aug 2018 15:57  --------------------------------------------------------  IN: 0 mL / OUT: 1200 mL / NET: -1200 mL    Physical Exam    Heart - regular (-)rub/gallop - harsh blowing systolic murmur  Lungs - bibasilar rales (-)wheeze  Abd - (+)BS soft NTND (-)r/r/g  Ext - pitting edema 2-3+ LE bilaterally  Neuro - alert/oriented and interactive via . moving all extremities and non-focal  Skin - no rash    LABS:                        11.8   9.4   )-----------( 195      ( 29 Aug 2018 15:05 )             37.2     08-29    141  |  104  |  14  ----------------------------<  163<H>  3.6   |  20<L>  |  0.66    Ca    8.6      29 Aug 2018 15:06    TPro  7.0  /  Alb  4.1  /  TBili  2.0<H>  /  DBili  x   /  AST  74<H>  /  ALT  125<H>  /  AlkPhos  79  08-29    PT/INR - ( 29 Aug 2018 15:06 )   PT: 12.5 sec;   INR: 1.12     PTT - ( 29 Aug 2018 15:06 )  PTT:34.3 sec    ABG - ( 29 Aug 2018 14:50 ) 7.44/32/131/99    RADIOLOGY & ADDITIONAL STUDIES: reviewed    Patient with CP and SOB/dyspnea symptoms found to have severe MR - planned OR tomorrow - cath today    1. CV  hemodynamically stable  sinus tach at this time  given lasix 40mg IV with excellent UO - 1200cc out  BP and HR control  medical optimization today anticipating OR in am  Cath today  LA 1.2; trop (-) and BNP 4493    2. Pulm   on nasal cannula - Sa02 good on 3L NC  ongoing diuresis  after lasix and good UO - able to lie flat - and intubation deferred - cath team made aware    3. Endocrine  maintain glycemic control  insulin infusion per protocol    HCP forms filled out and copy provided to family  DVT and GI prophylaxis    d/w patient/ and son (via cantonese ), staff. Cath team and CTS    I have spent/provided stated minutes of critical care time to this patient: 60 min

## 2018-08-29 NOTE — PATIENT PROFILE ADULT. - NS PRO PT REFERRAL QUES 2 YN
WOUND CARE: Staples will be removed at follow up office visit. Dressing will be removed at follow up appointment   BATHING: Please do not submerge wound underwater. You may shower and/or sponge bathe and pat wound dry  ACTIVITY: No heavy lifting anything more than 10-15lbs or straining. Otherwise, you may return to your usual level of physical activity. If you are taking narcotic pain medication (such as Percocet), do NOT drive a car, operate machinery or make important decisions.  DIET: Regular diet  NOTIFY YOUR SURGEON IF: You have any bleeding that does not stop, any pus draining from your wound, any fever (over 100.4 F) or chills, persistent nausea/vomiting with inability to tolerate food or liquids, persistent diarrhea, or if your pain is not controlled on your discharge pain medications.  FOLLOW-UP:  1. Please call to make a follow-up appointment for Monday 12/11  2. Please follow up with your primary care physician in one week regarding your procedure. no

## 2018-08-29 NOTE — H&P ADULT - NSHPSOCIALHISTORY_GEN_ALL_CORE
Denies smoking past/current.   Denies ETOH or drug use.  Lives with her .  Has supportive son as well.

## 2018-08-30 PROBLEM — Z00.00 ENCOUNTER FOR PREVENTIVE HEALTH EXAMINATION: Status: ACTIVE | Noted: 2018-08-30

## 2018-08-30 LAB
ALBUMIN SERPL ELPH-MCNC: 3.8 G/DL — SIGNIFICANT CHANGE UP (ref 3.3–5)
ALP SERPL-CCNC: 64 U/L — SIGNIFICANT CHANGE UP (ref 40–120)
ALT FLD-CCNC: 109 U/L — HIGH (ref 10–45)
ANION GAP SERPL CALC-SCNC: 16 MMOL/L — SIGNIFICANT CHANGE UP (ref 5–17)
ANION GAP SERPL CALC-SCNC: 16 MMOL/L — SIGNIFICANT CHANGE UP (ref 5–17)
ANION GAP SERPL CALC-SCNC: 17 MMOL/L — SIGNIFICANT CHANGE UP (ref 5–17)
APTT BLD: 32.9 SEC — SIGNIFICANT CHANGE UP (ref 27.5–37.4)
AST SERPL-CCNC: 54 U/L — HIGH (ref 10–40)
BILIRUB SERPL-MCNC: 1.8 MG/DL — HIGH (ref 0.2–1.2)
BUN SERPL-MCNC: 13 MG/DL — SIGNIFICANT CHANGE UP (ref 7–23)
BUN SERPL-MCNC: 13 MG/DL — SIGNIFICANT CHANGE UP (ref 7–23)
BUN SERPL-MCNC: 16 MG/DL — SIGNIFICANT CHANGE UP (ref 7–23)
CALCIUM SERPL-MCNC: 8.7 MG/DL — SIGNIFICANT CHANGE UP (ref 8.4–10.5)
CALCIUM SERPL-MCNC: 8.8 MG/DL — SIGNIFICANT CHANGE UP (ref 8.4–10.5)
CALCIUM SERPL-MCNC: 9 MG/DL — SIGNIFICANT CHANGE UP (ref 8.4–10.5)
CHLORIDE SERPL-SCNC: 101 MMOL/L — SIGNIFICANT CHANGE UP (ref 96–108)
CHLORIDE SERPL-SCNC: 103 MMOL/L — SIGNIFICANT CHANGE UP (ref 96–108)
CHLORIDE SERPL-SCNC: 106 MMOL/L — SIGNIFICANT CHANGE UP (ref 96–108)
CO2 SERPL-SCNC: 23 MMOL/L — SIGNIFICANT CHANGE UP (ref 22–31)
CO2 SERPL-SCNC: 23 MMOL/L — SIGNIFICANT CHANGE UP (ref 22–31)
CO2 SERPL-SCNC: 25 MMOL/L — SIGNIFICANT CHANGE UP (ref 22–31)
CREAT SERPL-MCNC: 0.73 MG/DL — SIGNIFICANT CHANGE UP (ref 0.5–1.3)
CREAT SERPL-MCNC: 0.73 MG/DL — SIGNIFICANT CHANGE UP (ref 0.5–1.3)
CREAT SERPL-MCNC: 0.78 MG/DL — SIGNIFICANT CHANGE UP (ref 0.5–1.3)
GAS PNL BLDA: SIGNIFICANT CHANGE UP
GAS PNL BLDA: SIGNIFICANT CHANGE UP
GLUCOSE BLDC GLUCOMTR-MCNC: 96 MG/DL — SIGNIFICANT CHANGE UP (ref 70–99)
GLUCOSE SERPL-MCNC: 103 MG/DL — HIGH (ref 70–99)
GLUCOSE SERPL-MCNC: 108 MG/DL — HIGH (ref 70–99)
GLUCOSE SERPL-MCNC: 152 MG/DL — HIGH (ref 70–99)
HCT VFR BLD CALC: 35.4 % — SIGNIFICANT CHANGE UP (ref 34.5–45)
HCT VFR BLD CALC: 36 % — SIGNIFICANT CHANGE UP (ref 34.5–45)
HGB BLD-MCNC: 11.2 G/DL — LOW (ref 11.5–15.5)
HGB BLD-MCNC: 11.4 G/DL — LOW (ref 11.5–15.5)
INR BLD: 1.14 — SIGNIFICANT CHANGE UP (ref 0.88–1.16)
MAGNESIUM SERPL-MCNC: 1.8 MG/DL — SIGNIFICANT CHANGE UP (ref 1.6–2.6)
MAGNESIUM SERPL-MCNC: 1.9 MG/DL — SIGNIFICANT CHANGE UP (ref 1.6–2.6)
MCHC RBC-ENTMCNC: 23.4 PG — LOW (ref 27–34)
MCHC RBC-ENTMCNC: 23.6 PG — LOW (ref 27–34)
MCHC RBC-ENTMCNC: 31.6 G/DL — LOW (ref 32–36)
MCHC RBC-ENTMCNC: 31.7 G/DL — LOW (ref 32–36)
MCV RBC AUTO: 73.9 FL — LOW (ref 80–100)
MCV RBC AUTO: 74.5 FL — LOW (ref 80–100)
PHOSPHATE SERPL-MCNC: 4.4 MG/DL — SIGNIFICANT CHANGE UP (ref 2.5–4.5)
PLATELET # BLD AUTO: 119 K/UL — LOW (ref 150–400)
PLATELET # BLD AUTO: 135 K/UL — LOW (ref 150–400)
POTASSIUM SERPL-MCNC: 3.6 MMOL/L — SIGNIFICANT CHANGE UP (ref 3.5–5.3)
POTASSIUM SERPL-MCNC: 3.9 MMOL/L — SIGNIFICANT CHANGE UP (ref 3.5–5.3)
POTASSIUM SERPL-MCNC: 4.2 MMOL/L — SIGNIFICANT CHANGE UP (ref 3.5–5.3)
POTASSIUM SERPL-SCNC: 3.6 MMOL/L — SIGNIFICANT CHANGE UP (ref 3.5–5.3)
POTASSIUM SERPL-SCNC: 3.9 MMOL/L — SIGNIFICANT CHANGE UP (ref 3.5–5.3)
POTASSIUM SERPL-SCNC: 4.2 MMOL/L — SIGNIFICANT CHANGE UP (ref 3.5–5.3)
PROT SERPL-MCNC: 6.1 G/DL — SIGNIFICANT CHANGE UP (ref 6–8.3)
PROTHROM AB SERPL-ACNC: 12.7 SEC — SIGNIFICANT CHANGE UP (ref 9.8–12.7)
RBC # BLD: 4.75 M/UL — SIGNIFICANT CHANGE UP (ref 3.8–5.2)
RBC # BLD: 4.87 M/UL — SIGNIFICANT CHANGE UP (ref 3.8–5.2)
RBC # FLD: 16.8 % — SIGNIFICANT CHANGE UP (ref 10.3–16.9)
RBC # FLD: 17 % — HIGH (ref 10.3–16.9)
SODIUM SERPL-SCNC: 142 MMOL/L — SIGNIFICANT CHANGE UP (ref 135–145)
SODIUM SERPL-SCNC: 143 MMOL/L — SIGNIFICANT CHANGE UP (ref 135–145)
SODIUM SERPL-SCNC: 145 MMOL/L — SIGNIFICANT CHANGE UP (ref 135–145)
WBC # BLD: 8.4 K/UL — SIGNIFICANT CHANGE UP (ref 3.8–10.5)
WBC # BLD: 9.3 K/UL — SIGNIFICANT CHANGE UP (ref 3.8–10.5)
WBC # FLD AUTO: 8.4 K/UL — SIGNIFICANT CHANGE UP (ref 3.8–10.5)
WBC # FLD AUTO: 9.3 K/UL — SIGNIFICANT CHANGE UP (ref 3.8–10.5)

## 2018-08-30 PROCEDURE — 99291 CRITICAL CARE FIRST HOUR: CPT

## 2018-08-30 PROCEDURE — 94010 BREATHING CAPACITY TEST: CPT | Mod: 26

## 2018-08-30 PROCEDURE — 71045 X-RAY EXAM CHEST 1 VIEW: CPT | Mod: 26

## 2018-08-30 RX ORDER — FUROSEMIDE 40 MG
20 TABLET ORAL ONCE
Qty: 0 | Refills: 0 | Status: COMPLETED | OUTPATIENT
Start: 2018-08-30 | End: 2018-08-30

## 2018-08-30 RX ORDER — POTASSIUM CHLORIDE 20 MEQ
20 PACKET (EA) ORAL ONCE
Qty: 0 | Refills: 0 | Status: COMPLETED | OUTPATIENT
Start: 2018-08-30 | End: 2018-08-30

## 2018-08-30 RX ORDER — CHLORHEXIDINE GLUCONATE 213 G/1000ML
1 SOLUTION TOPICAL ONCE
Qty: 0 | Refills: 0 | Status: COMPLETED | OUTPATIENT
Start: 2018-08-30 | End: 2018-08-30

## 2018-08-30 RX ORDER — LIDOCAINE 4 G/100G
1 CREAM TOPICAL ONCE
Qty: 0 | Refills: 0 | Status: COMPLETED | OUTPATIENT
Start: 2018-08-30 | End: 2018-08-30

## 2018-08-30 RX ORDER — CHLORHEXIDINE GLUCONATE 213 G/1000ML
1 SOLUTION TOPICAL ONCE
Qty: 0 | Refills: 0 | Status: COMPLETED | OUTPATIENT
Start: 2018-08-31 | End: 2018-08-31

## 2018-08-30 RX ORDER — CHLORHEXIDINE GLUCONATE 213 G/1000ML
10 SOLUTION TOPICAL ONCE
Qty: 0 | Refills: 0 | Status: COMPLETED | OUTPATIENT
Start: 2018-08-30 | End: 2018-08-31

## 2018-08-30 RX ORDER — METOPROLOL TARTRATE 50 MG
2.5 TABLET ORAL EVERY 6 HOURS
Qty: 0 | Refills: 0 | Status: DISCONTINUED | OUTPATIENT
Start: 2018-08-30 | End: 2018-08-30

## 2018-08-30 RX ORDER — MAGNESIUM SULFATE 500 MG/ML
2 VIAL (ML) INJECTION ONCE
Qty: 0 | Refills: 0 | Status: COMPLETED | OUTPATIENT
Start: 2018-08-30 | End: 2018-08-30

## 2018-08-30 RX ORDER — CHLORHEXIDINE GLUCONATE 213 G/1000ML
10 SOLUTION TOPICAL ONCE
Qty: 0 | Refills: 0 | Status: DISCONTINUED | OUTPATIENT
Start: 2018-08-30 | End: 2018-08-31

## 2018-08-30 RX ORDER — METOPROLOL TARTRATE 50 MG
12.5 TABLET ORAL EVERY 12 HOURS
Qty: 0 | Refills: 0 | Status: DISCONTINUED | OUTPATIENT
Start: 2018-08-30 | End: 2018-08-31

## 2018-08-30 RX ORDER — POTASSIUM CHLORIDE 20 MEQ
20 PACKET (EA) ORAL ONCE
Qty: 0 | Refills: 0 | Status: COMPLETED | OUTPATIENT
Start: 2018-08-31 | End: 2018-08-31

## 2018-08-30 RX ADMIN — CHLORHEXIDINE GLUCONATE 1 APPLICATION(S): 213 SOLUTION TOPICAL at 07:18

## 2018-08-30 RX ADMIN — Medication 12.5 MILLIGRAM(S): at 18:40

## 2018-08-30 RX ADMIN — Medication 20 MILLIGRAM(S): at 13:46

## 2018-08-30 RX ADMIN — Medication 20 MILLIGRAM(S): at 02:06

## 2018-08-30 RX ADMIN — LIDOCAINE 1 PATCH: 4 CREAM TOPICAL at 21:34

## 2018-08-30 RX ADMIN — HEPARIN SODIUM 5000 UNIT(S): 5000 INJECTION INTRAVENOUS; SUBCUTANEOUS at 21:34

## 2018-08-30 RX ADMIN — FAMOTIDINE 20 MILLIGRAM(S): 10 INJECTION INTRAVENOUS at 17:12

## 2018-08-30 RX ADMIN — HEPARIN SODIUM 5000 UNIT(S): 5000 INJECTION INTRAVENOUS; SUBCUTANEOUS at 13:08

## 2018-08-30 RX ADMIN — CHLORHEXIDINE GLUCONATE 1 APPLICATION(S): 213 SOLUTION TOPICAL at 22:52

## 2018-08-30 RX ADMIN — FAMOTIDINE 20 MILLIGRAM(S): 10 INJECTION INTRAVENOUS at 07:18

## 2018-08-30 RX ADMIN — Medication 2.5 MILLIGRAM(S): at 13:07

## 2018-08-30 RX ADMIN — HEPARIN SODIUM 5000 UNIT(S): 5000 INJECTION INTRAVENOUS; SUBCUTANEOUS at 07:18

## 2018-08-30 RX ADMIN — Medication 100 MILLIEQUIVALENT(S): at 23:28

## 2018-08-30 RX ADMIN — CHLORHEXIDINE GLUCONATE 1 APPLICATION(S): 213 SOLUTION TOPICAL at 19:50

## 2018-08-30 RX ADMIN — Medication 50 GRAM(S): at 02:50

## 2018-08-30 RX ADMIN — CHLORHEXIDINE GLUCONATE 1 APPLICATION(S): 213 SOLUTION TOPICAL at 13:06

## 2018-08-30 RX ADMIN — CHLORHEXIDINE GLUCONATE 10 MILLILITER(S): 213 SOLUTION TOPICAL at 08:04

## 2018-08-30 NOTE — PROGRESS NOTE ADULT - SUBJECTIVE AND OBJECTIVE BOX
CTICU  CRITICAL  CARE  attending     Hand off received @ 7a					   Pertinent clinical, laboratory, radiographic, hemodynamic, echocardiographic, respiratory data, microbiologic data and chart were reviewed and analyzed frequently throughout the course of the day and night  Patient seen and examined with CTS/ SH attending at bedside    Pt is a 68y , Female, admitted with acute pulm edema in the setting of severe mitral regurgitation; hosp day # 1     today:    remains on supplemental O2  being diuresed  preop for tmrw    Mitral regurgitation: Mitral regurgitation      , FAMILY HISTORY:  No pertinent family history in first degree relatives  PAST MEDICAL & SURGICAL HISTORY:  Mitral regurgitation  Anemia  HTN (hypertension)  No significant past surgical history    Patient is a 68y old  Female who presents with a chief complaint of Severe mitral regurgitation (29 Aug 2018 14:27)      14 system review was unremarkable  acute changes include acute respiratory failure  Vital signs, hemodynamic and respiratory parameters were reviewed from the bedside nursing flowsheet.  ICU Vital Signs Last 24 Hrs  T(C): 36 (30 Aug 2018 09:55), Max: 36.9 (29 Aug 2018 11:49)  T(F): 96.8 (30 Aug 2018 09:55), Max: 98.4 (29 Aug 2018 11:49)  HR: 104 (30 Aug 2018 10:00) (78 - 104)  BP: 136/69 (30 Aug 2018 03:18) (130/63 - 152/100)  BP(mean): 119 (29 Aug 2018 14:12) (119 - 120)  ABP: 140/80 (30 Aug 2018 10:00) (126/66 - 172/100)  ABP(mean): 102 (30 Aug 2018 10:00) (86 - 124)  RR: 30 (30 Aug 2018 10:00) (18 - 39)  SpO2: 98% (30 Aug 2018 10:00) (95% - 100%)    Adult Advanced Hemodynamics Last 24 Hrs  CVP(mm Hg): 0 (30 Aug 2018 00:00) (-5 - 1)  CVP(cm H2O): --  CO: --  CI: --  PA: --  PA(mean): --  PCWP: --  SVR: --  SVRI: --  PVR: --  PVRI: --, ABG - ( 30 Aug 2018 03:14 )  pH, Arterial: 7.45  pH, Blood: x     /  pCO2: 36    /  pO2: 84    / HCO3: 25    / Base Excess: 1.2   /  SaO2: 96                  Intake and output was reviewed and the fluid balance was calculated  Daily Height in cm: 152.4 (30 Aug 2018 03:18)    Daily   I&O's Summary    29 Aug 2018 07:01  -  30 Aug 2018 07:00  --------------------------------------------------------  IN: 340 mL / OUT: 3670 mL / NET: -3330 mL    30 Aug 2018 07:01  -  30 Aug 2018 11:20  --------------------------------------------------------  IN: 30 mL / OUT: 130 mL / NET: -100 mL        All lines and drain sites were assessed  Glycemic trend was reviewedCAPILLARY BLOOD GLUCOSE        No acute change in mental status  Auscultation of the chest reveals equal bs  Abdomen is soft  Extremities are warm and well perfused  Wounds appear clean and unremarkable  Antibiotics are periop    labs  CBC Full  -  ( 30 Aug 2018 03:26 )  WBC Count : 8.4 K/uL  Hemoglobin : 11.2 g/dL  Hematocrit : 35.4 %  Platelet Count - Automated : 119 K/uL  Mean Cell Volume : 74.5 fL  Mean Cell Hemoglobin : 23.6 pg  Mean Cell Hemoglobin Concentration : 31.6 g/dL  Auto Neutrophil # : x  Auto Lymphocyte # : x  Auto Monocyte # : x  Auto Eosinophil # : x  Auto Basophil # : x  Auto Neutrophil % : x  Auto Lymphocyte % : x  Auto Monocyte % : x  Auto Eosinophil % : x  Auto Basophil % : x    08-30    143  |  103  |  13  ----------------------------<  108<H>  3.6   |  23  |  0.73    Ca    8.8      30 Aug 2018 03:26  Phos  4.4     08-30  Mg     1.8     08-30    TPro  6.1  /  Alb  3.8  /  TBili  1.8<H>  /  DBili  x   /  AST  54<H>  /  ALT  109<H>  /  AlkPhos  64  08-30    PT/INR - ( 30 Aug 2018 03:26 )   PT: 12.7 sec;   INR: 1.14          PTT - ( 30 Aug 2018 03:26 )  PTT:32.9 sec  The current medications were reviewed   MEDICATIONS  (STANDING):  chlorhexidine 2% Cloths 1 Application(s) Topical daily  famotidine Injectable 20 milliGRAM(s) IV Push every 12 hours  heparin  Injectable 5000 Unit(s) SubCutaneous every 8 hours  insulin Infusion 2 Unit(s)/Hr (2 mL/Hr) IV Continuous <Continuous>  sodium chloride 0.9%. 1000 milliLiter(s) (10 mL/Hr) IV Continuous <Continuous>    MEDICATIONS  (PRN):       PROBLEM LIST/ ASSESSMENT:  HEALTH ISSUES - PROBLEM Dx:    acute systolic HF  severe MR  Mitral regurgitation: Mitral regurgitation      ,   Patient is a 68y old  Female who presents with a chief complaint of Severe mitral regurgitation (29 Aug 2018 14:27)    admitted with acute pulm edema in the setting of severe MR      My plan includes :  close hemodynamic, ventilatory and drain monitoring and management per post op routine  OR tmrw for mitral valve replacement  Monitor for arrhythmias and monitor parameters for organ perfusion  monitor neurologic status  Head of the bed should remain elevated to 45 deg .   chest PT and IS will be encouraged  monitor adequacy of oxygenation and ventilation and attempt to wean oxygen  Nutritional goals will be met using po eventually , ensure adequate caloric intake and montior the same  Stress ulcer and VTE prophylaxis will be achieved    Glycemic control is satisfactory  Electrolytes have been repleted as necessary and wound care has been carried out. Pain control has been achieved.   agressive physical therapy and early mobility and ambulation goals will be met   The family was updated about the course and plan  CRITICAL CARE TIME SPENT in evaluation and management, reassessments, review and interpretation of labs and x-rays, ventilator and hemodynamic management, formulating a plan and coordinating care: ___45____ MIN.  Time does not include procedural time.  CTICU ATTENDING     					    Brandyn Everett MD

## 2018-08-30 NOTE — PROGRESS NOTE ADULT - SUBJECTIVE AND OBJECTIVE BOX
Planned Date of Surgery:       8/31/18                                                                                                           Surgeon: Dr. Raya    Procedure: MVR possible TVR    HPI:  Patient is a 67 y/o female, Cantonese speaking accompanied by her son who speaks English and her ), with PMHx HTN, PNA (once per year over the past 3-4 years) and anemia who presented to the ED this morning with complaints of chest pain and SOB.  She started having CP/SOB intermittently over the past week, worse with exertion, NYHA class III symptoms, however she is now having symptoms at rest, NYHA class IV.  Her chest pain was substernal, non-radiating, intermittent, self-limiting and severe in nature.  She also had loss of appetite, eating only a small amount of porridge and sips of water each day, with "little" urine and no BM in a few days.  Her family "talked her into" seeing her PCP yesterday, and they thought she might have a PNA due to her CXR findings.  They also sent her to a Cardiologist Dr. Smith who performed a TTE revealing "mitral valve prolapse".  She was told to go to the ED, but she refused and went home.  Today her family convinced her to come to the ED since her CP/SOB got worse.  In the ED, her BP was stable at 130'70, HR in the 90's NSR and O2 96-98% on room air.  The ED team gave her ~1L of fluid given her recent poor appetite.  They called us to evaluate her for r/o PNA and MV disease.  Upon seeing her, she complained of overall weakness and "pain all over" with SOB.  She also has new LE edema over the past week.  She has low back pain, but states this is chronic and intermittent.  She last ate today at 9am, small amt of porridge.      PAST MEDICAL & SURGICAL HISTORY:  Mitral regurgitation  Anemia  HTN (hypertension)  No significant past surgical history      No Known Allergies      MEDICATIONS  (STANDING):  chlorhexidine 0.12% Liquid 10 milliLiter(s) Swish and Spit once  chlorhexidine 0.12% Liquid 10 milliLiter(s) Swish and Spit once  chlorhexidine 2% Cloths 1 Application(s) Topical daily  chlorhexidine 4% Liquid 1 Application(s) Topical once  chlorhexidine 4% Liquid 1 Application(s) Topical once  chlorhexidine 4% Liquid 1 Application(s) Topical once  chlorhexidine 4% Liquid 1 Application(s) Topical once  famotidine Injectable 20 milliGRAM(s) IV Push every 12 hours  heparin  Injectable 5000 Unit(s) SubCutaneous every 8 hours  insulin Infusion 2 Unit(s)/Hr (2 mL/Hr) IV Continuous <Continuous>  magnesium sulfate  IVPB 2 Gram(s) IV Intermittent once  metoprolol tartrate 12.5 milliGRAM(s) Oral every 12 hours  sodium chloride 0.9%. 1000 milliLiter(s) (10 mL/Hr) IV Continuous <Continuous>    MEDICATIONS  (PRN):    On Beta Blocker? YES    Labs:                        11.4   9.3   )-----------( 135      ( 30 Aug 2018 13:48 )             36.0     08-30    145  |  106  |  13  ----------------------------<  103<H>  4.2   |  23  |  0.73    Ca    9.0      30 Aug 2018 13:48  Phos  4.4     08-30  Mg     1.9     08-30    TPro  6.1  /  Alb  3.8  /  TBili  1.8<H>  /  DBili  x   /  AST  54<H>  /  ALT  109<H>  /  AlkPhos  64  08-30    PT/INR - ( 30 Aug 2018 03:26 )   PT: 12.7 sec;   INR: 1.14          PTT - ( 30 Aug 2018 03:26 )  PTT:32.9 sec    Hemoglobin A1C, Whole Blood: 5.4 % (08-29-18 @ 15:05)    ABG - ( 30 Aug 2018 13:29 )  pH, Arterial: 7.47  pH, Blood: x     /  pCO2: 33    /  pO2: 127   / HCO3: 23    / Base Excess: 0.3   /  SaO2: 99          CARDIAC MARKERS ( 29 Aug 2018 11:50 )  x     / <0.01 ng/mL / x     / x     / x          Hgb A1C: 5.4    CXR:  < from: Xray Chest 1 View- PORTABLE-Routine (08.30.18 @ 03:40) >  EXAM:  XR CHEST PORTABLE ROUTINE 1V                          PROCEDURE DATE:  08/30/2018          INTERPRETATION:  Chest x-ray    Indication: Shortness of breath    A portable frontal view of the chest is compared to the prior study dated   8/29/2018. Right IJ line is unchanged. Mild cardiomegaly persists. Mild   congestion. There is again obliteration of the right cardiophrenic angle.   No pneumothorax is identified.    IMPRESSION: No significant change.    < end of copied text >    Echo:  < from: Echocardiogram (08.29.18 @ 13:46) >  InterpretationSummary  The left atrium is severely dilated. The left atrial volume index is 70   cc/m2   (normal <34cc/m2)  There is mild concentric left ventricular   hypertrophy.Normal left ventricular size.The left ventricular wall   motion is   normal. The left ventricular ejection fraction is 70%.  Right atrial   size is   normal.The right ventricle is normal in size and function.There is   moderate   tricuspid regurgitation.Structurally normal pulmonic valve. There is mild   pulmonic regurgitation.The aortic valve is trileaflet. No aortic   regurgitation   noted.The pulmonary artery systolic pressure is estimated to be 86 mmHg.   There   is severe pulmonary hypertension.  The IVC is dilated (>2.1 cm) with an   abnormal inspiratory collapse (<50%) consistent with elevated right   atrial   pressure.  No aortic root dilatation.Normal size aortic arch with no   hemodynamic evidence for coarctation.A trivial pericardial effusion   noted.    < end of copied text >    Consult in Chart?  YES  Consent in Chart? YES   Pre-op Orders Placed? YES  Blood Products Ordered? YES   NPO ordered? YES

## 2018-08-31 ENCOUNTER — APPOINTMENT (OUTPATIENT)
Dept: CARDIOTHORACIC SURGERY | Facility: HOSPITAL | Age: 68
End: 2018-08-31
Payer: MEDICARE

## 2018-08-31 ENCOUNTER — RESULT REVIEW (OUTPATIENT)
Age: 68
End: 2018-08-31

## 2018-08-31 LAB
ALBUMIN SERPL ELPH-MCNC: 3.5 G/DL — SIGNIFICANT CHANGE UP (ref 3.3–5)
ALBUMIN SERPL ELPH-MCNC: 4 G/DL — SIGNIFICANT CHANGE UP (ref 3.3–5)
ALBUMIN SERPL ELPH-MCNC: 4.1 G/DL — SIGNIFICANT CHANGE UP (ref 3.3–5)
ALBUMIN SERPL ELPH-MCNC: 4.2 G/DL — SIGNIFICANT CHANGE UP (ref 3.3–5)
ALP SERPL-CCNC: 44 U/L — SIGNIFICANT CHANGE UP (ref 40–120)
ALP SERPL-CCNC: 45 U/L — SIGNIFICANT CHANGE UP (ref 40–120)
ALP SERPL-CCNC: 46 U/L — SIGNIFICANT CHANGE UP (ref 40–120)
ALP SERPL-CCNC: 57 U/L — SIGNIFICANT CHANGE UP (ref 40–120)
ALT FLD-CCNC: 54 U/L — HIGH (ref 10–45)
ALT FLD-CCNC: 65 U/L — HIGH (ref 10–45)
ALT FLD-CCNC: 68 U/L — HIGH (ref 10–45)
ALT FLD-CCNC: 82 U/L — HIGH (ref 10–45)
ANION GAP SERPL CALC-SCNC: 14 MMOL/L — SIGNIFICANT CHANGE UP (ref 5–17)
ANION GAP SERPL CALC-SCNC: 14 MMOL/L — SIGNIFICANT CHANGE UP (ref 5–17)
ANION GAP SERPL CALC-SCNC: 16 MMOL/L — SIGNIFICANT CHANGE UP (ref 5–17)
ANION GAP SERPL CALC-SCNC: 18 MMOL/L — HIGH (ref 5–17)
ANISOCYTOSIS BLD QL: SIGNIFICANT CHANGE UP
APTT BLD: 32.8 SEC — SIGNIFICANT CHANGE UP (ref 27.5–37.4)
APTT BLD: 33.2 SEC — SIGNIFICANT CHANGE UP (ref 27.5–37.4)
APTT BLD: 41.6 SEC — HIGH (ref 27.5–37.4)
APTT BLD: 50.2 SEC — HIGH (ref 27.5–37.4)
AST SERPL-CCNC: 35 U/L — SIGNIFICANT CHANGE UP (ref 10–40)
AST SERPL-CCNC: 53 U/L — HIGH (ref 10–40)
AST SERPL-CCNC: 53 U/L — HIGH (ref 10–40)
AST SERPL-CCNC: 58 U/L — HIGH (ref 10–40)
BILIRUB SERPL-MCNC: 1.8 MG/DL — HIGH (ref 0.2–1.2)
BILIRUB SERPL-MCNC: 2 MG/DL — HIGH (ref 0.2–1.2)
BILIRUB SERPL-MCNC: 2.1 MG/DL — HIGH (ref 0.2–1.2)
BILIRUB SERPL-MCNC: 2.9 MG/DL — HIGH (ref 0.2–1.2)
BUN SERPL-MCNC: 14 MG/DL — SIGNIFICANT CHANGE UP (ref 7–23)
BUN SERPL-MCNC: 15 MG/DL — SIGNIFICANT CHANGE UP (ref 7–23)
BUN SERPL-MCNC: 16 MG/DL — SIGNIFICANT CHANGE UP (ref 7–23)
BUN SERPL-MCNC: 17 MG/DL — SIGNIFICANT CHANGE UP (ref 7–23)
CALCIUM SERPL-MCNC: 8 MG/DL — LOW (ref 8.4–10.5)
CALCIUM SERPL-MCNC: 8.6 MG/DL — SIGNIFICANT CHANGE UP (ref 8.4–10.5)
CALCIUM SERPL-MCNC: 9 MG/DL — SIGNIFICANT CHANGE UP (ref 8.4–10.5)
CALCIUM SERPL-MCNC: 9.2 MG/DL — SIGNIFICANT CHANGE UP (ref 8.4–10.5)
CHLORIDE SERPL-SCNC: 100 MMOL/L — SIGNIFICANT CHANGE UP (ref 96–108)
CHLORIDE SERPL-SCNC: 95 MMOL/L — LOW (ref 96–108)
CHLORIDE SERPL-SCNC: 98 MMOL/L — SIGNIFICANT CHANGE UP (ref 96–108)
CHLORIDE SERPL-SCNC: 98 MMOL/L — SIGNIFICANT CHANGE UP (ref 96–108)
CO2 SERPL-SCNC: 24 MMOL/L — SIGNIFICANT CHANGE UP (ref 22–31)
CO2 SERPL-SCNC: 26 MMOL/L — SIGNIFICANT CHANGE UP (ref 22–31)
CO2 SERPL-SCNC: 27 MMOL/L — SIGNIFICANT CHANGE UP (ref 22–31)
CO2 SERPL-SCNC: 28 MMOL/L — SIGNIFICANT CHANGE UP (ref 22–31)
CREAT SERPL-MCNC: 0.6 MG/DL — SIGNIFICANT CHANGE UP (ref 0.5–1.3)
CREAT SERPL-MCNC: 0.63 MG/DL — SIGNIFICANT CHANGE UP (ref 0.5–1.3)
CREAT SERPL-MCNC: 0.72 MG/DL — SIGNIFICANT CHANGE UP (ref 0.5–1.3)
CREAT SERPL-MCNC: 0.76 MG/DL — SIGNIFICANT CHANGE UP (ref 0.5–1.3)
EOSINOPHIL NFR BLD AUTO: 1 % — SIGNIFICANT CHANGE UP (ref 0–6)
GAS PNL BLDA: SIGNIFICANT CHANGE UP
GLUCOSE BLDC GLUCOMTR-MCNC: 144 MG/DL — HIGH (ref 70–99)
GLUCOSE BLDC GLUCOMTR-MCNC: 147 MG/DL — HIGH (ref 70–99)
GLUCOSE BLDC GLUCOMTR-MCNC: 151 MG/DL — HIGH (ref 70–99)
GLUCOSE SERPL-MCNC: 103 MG/DL — HIGH (ref 70–99)
GLUCOSE SERPL-MCNC: 143 MG/DL — HIGH (ref 70–99)
GLUCOSE SERPL-MCNC: 158 MG/DL — HIGH (ref 70–99)
GLUCOSE SERPL-MCNC: 160 MG/DL — HIGH (ref 70–99)
HCT VFR BLD CALC: 29.1 % — LOW (ref 34.5–45)
HCT VFR BLD CALC: 31.6 % — LOW (ref 34.5–45)
HCT VFR BLD CALC: 31.7 % — LOW (ref 34.5–45)
HCT VFR BLD CALC: 35.8 % — SIGNIFICANT CHANGE UP (ref 34.5–45)
HGB BLD-MCNC: 11.4 G/DL — LOW (ref 11.5–15.5)
HGB BLD-MCNC: 9.2 G/DL — LOW (ref 11.5–15.5)
HGB BLD-MCNC: 9.6 G/DL — LOW (ref 11.5–15.5)
HGB BLD-MCNC: 9.9 G/DL — LOW (ref 11.5–15.5)
HYPOCHROMIA BLD QL: SIGNIFICANT CHANGE UP
INR BLD: 1.16 — SIGNIFICANT CHANGE UP (ref 0.88–1.16)
INR BLD: 1.2 — HIGH (ref 0.88–1.16)
INR BLD: 1.27 — HIGH (ref 0.88–1.16)
INR BLD: 1.33 — HIGH (ref 0.88–1.16)
LACTATE SERPL-SCNC: 1.2 MMOL/L — SIGNIFICANT CHANGE UP (ref 0.5–2)
LACTATE SERPL-SCNC: 1.6 MMOL/L — SIGNIFICANT CHANGE UP (ref 0.5–2)
LACTATE SERPL-SCNC: 2.2 MMOL/L — HIGH (ref 0.5–2)
LYMPHOCYTES # BLD AUTO: 5.2 % — LOW (ref 13–44)
LYMPHOCYTES # BLD AUTO: 8 % — LOW (ref 13–44)
MAGNESIUM SERPL-MCNC: 2.2 MG/DL — SIGNIFICANT CHANGE UP (ref 1.6–2.6)
MAGNESIUM SERPL-MCNC: 2.3 MG/DL — SIGNIFICANT CHANGE UP (ref 1.6–2.6)
MAGNESIUM SERPL-MCNC: 2.6 MG/DL — SIGNIFICANT CHANGE UP (ref 1.6–2.6)
MAGNESIUM SERPL-MCNC: 3.6 MG/DL — HIGH (ref 1.6–2.6)
MANUAL DIF COMMENT BLD-IMP: SIGNIFICANT CHANGE UP
MANUAL SMEAR VERIFICATION: YES — SIGNIFICANT CHANGE UP
MCHC RBC-ENTMCNC: 22.8 PG — LOW (ref 27–34)
MCHC RBC-ENTMCNC: 22.9 PG — LOW (ref 27–34)
MCHC RBC-ENTMCNC: 23.4 PG — LOW (ref 27–34)
MCHC RBC-ENTMCNC: 23.5 PG — LOW (ref 27–34)
MCHC RBC-ENTMCNC: 30.3 G/DL — LOW (ref 32–36)
MCHC RBC-ENTMCNC: 31.3 G/DL — LOW (ref 32–36)
MCHC RBC-ENTMCNC: 31.6 G/DL — LOW (ref 32–36)
MCHC RBC-ENTMCNC: 31.8 G/DL — LOW (ref 32–36)
MCV RBC AUTO: 71.7 FL — LOW (ref 80–100)
MCV RBC AUTO: 74 FL — LOW (ref 80–100)
MCV RBC AUTO: 74.9 FL — LOW (ref 80–100)
MCV RBC AUTO: 75.5 FL — LOW (ref 80–100)
MICROCYTES BLD QL: SLIGHT — SIGNIFICANT CHANGE UP
MONOCYTES NFR BLD AUTO: 2 % — SIGNIFICANT CHANGE UP (ref 2–14)
MONOCYTES NFR BLD AUTO: 5.8 % — SIGNIFICANT CHANGE UP (ref 2–14)
NEUTROPHILS NFR BLD AUTO: 83 % — HIGH (ref 43–77)
NEUTROPHILS NFR BLD AUTO: 88.9 % — HIGH (ref 43–77)
NEUTS BAND # BLD: 6 % — SIGNIFICANT CHANGE UP
OVALOCYTES BLD QL SMEAR: SLIGHT — SIGNIFICANT CHANGE UP
PHOSPHATE SERPL-MCNC: 3.5 MG/DL — SIGNIFICANT CHANGE UP (ref 2.5–4.5)
PHOSPHATE SERPL-MCNC: 3.6 MG/DL — SIGNIFICANT CHANGE UP (ref 2.5–4.5)
PHOSPHATE SERPL-MCNC: 3.6 MG/DL — SIGNIFICANT CHANGE UP (ref 2.5–4.5)
PHOSPHATE SERPL-MCNC: 3.8 MG/DL — SIGNIFICANT CHANGE UP (ref 2.5–4.5)
PLAT MORPH BLD: NORMAL — SIGNIFICANT CHANGE UP
PLATELET # BLD AUTO: 105 K/UL — LOW (ref 150–400)
PLATELET # BLD AUTO: 142 K/UL — LOW (ref 150–400)
PLATELET # BLD AUTO: 86 K/UL — LOW (ref 150–400)
PLATELET # BLD AUTO: 96 K/UL — LOW (ref 150–400)
POIKILOCYTOSIS BLD QL AUTO: SIGNIFICANT CHANGE UP
POLYCHROMASIA BLD QL SMEAR: SLIGHT — SIGNIFICANT CHANGE UP
POTASSIUM SERPL-MCNC: 3.8 MMOL/L — SIGNIFICANT CHANGE UP (ref 3.5–5.3)
POTASSIUM SERPL-MCNC: 4 MMOL/L — SIGNIFICANT CHANGE UP (ref 3.5–5.3)
POTASSIUM SERPL-MCNC: 4.2 MMOL/L — SIGNIFICANT CHANGE UP (ref 3.5–5.3)
POTASSIUM SERPL-MCNC: 4.7 MMOL/L — SIGNIFICANT CHANGE UP (ref 3.5–5.3)
POTASSIUM SERPL-SCNC: 3.8 MMOL/L — SIGNIFICANT CHANGE UP (ref 3.5–5.3)
POTASSIUM SERPL-SCNC: 4 MMOL/L — SIGNIFICANT CHANGE UP (ref 3.5–5.3)
POTASSIUM SERPL-SCNC: 4.2 MMOL/L — SIGNIFICANT CHANGE UP (ref 3.5–5.3)
POTASSIUM SERPL-SCNC: 4.7 MMOL/L — SIGNIFICANT CHANGE UP (ref 3.5–5.3)
PROT SERPL-MCNC: 5.5 G/DL — LOW (ref 6–8.3)
PROT SERPL-MCNC: 6.1 G/DL — SIGNIFICANT CHANGE UP (ref 6–8.3)
PROT SERPL-MCNC: 6.1 G/DL — SIGNIFICANT CHANGE UP (ref 6–8.3)
PROT SERPL-MCNC: 6.6 G/DL — SIGNIFICANT CHANGE UP (ref 6–8.3)
PROTHROM AB SERPL-ACNC: 12.9 SEC — HIGH (ref 9.8–12.7)
PROTHROM AB SERPL-ACNC: 13.4 SEC — HIGH (ref 9.8–12.7)
PROTHROM AB SERPL-ACNC: 14.2 SEC — HIGH (ref 9.8–12.7)
PROTHROM AB SERPL-ACNC: 14.9 SEC — HIGH (ref 9.8–12.7)
RBC # BLD: 3.93 M/UL — SIGNIFICANT CHANGE UP (ref 3.8–5.2)
RBC # BLD: 4.2 M/UL — SIGNIFICANT CHANGE UP (ref 3.8–5.2)
RBC # BLD: 4.22 M/UL — SIGNIFICANT CHANGE UP (ref 3.8–5.2)
RBC # BLD: 4.99 M/UL — SIGNIFICANT CHANGE UP (ref 3.8–5.2)
RBC # FLD: 16.4 % — SIGNIFICANT CHANGE UP (ref 10.3–16.9)
RBC # FLD: 16.7 % — SIGNIFICANT CHANGE UP (ref 10.3–16.9)
RBC # FLD: 17.1 % — HIGH (ref 10.3–16.9)
RBC # FLD: 17.3 % — HIGH (ref 10.3–16.9)
RBC BLD AUTO: ABNORMAL
SODIUM SERPL-SCNC: 137 MMOL/L — SIGNIFICANT CHANGE UP (ref 135–145)
SODIUM SERPL-SCNC: 140 MMOL/L — SIGNIFICANT CHANGE UP (ref 135–145)
SODIUM SERPL-SCNC: 140 MMOL/L — SIGNIFICANT CHANGE UP (ref 135–145)
SODIUM SERPL-SCNC: 141 MMOL/L — SIGNIFICANT CHANGE UP (ref 135–145)
SPHEROCYTES BLD QL SMEAR: SLIGHT — SIGNIFICANT CHANGE UP
WBC # BLD: 10.4 K/UL — SIGNIFICANT CHANGE UP (ref 3.8–10.5)
WBC # BLD: 13.8 K/UL — HIGH (ref 3.8–10.5)
WBC # BLD: 17.4 K/UL — HIGH (ref 3.8–10.5)
WBC # BLD: 9.5 K/UL — SIGNIFICANT CHANGE UP (ref 3.8–10.5)
WBC # FLD AUTO: 10.4 K/UL — SIGNIFICANT CHANGE UP (ref 3.8–10.5)
WBC # FLD AUTO: 13.8 K/UL — HIGH (ref 3.8–10.5)
WBC # FLD AUTO: 17.4 K/UL — HIGH (ref 3.8–10.5)
WBC # FLD AUTO: 9.5 K/UL — SIGNIFICANT CHANGE UP (ref 3.8–10.5)

## 2018-08-31 PROCEDURE — 93010 ELECTROCARDIOGRAM REPORT: CPT

## 2018-08-31 PROCEDURE — 33427 REPAIR OF MITRAL VALVE: CPT | Mod: 80

## 2018-08-31 PROCEDURE — 33427 REPAIR OF MITRAL VALVE: CPT

## 2018-08-31 PROCEDURE — 99291 CRITICAL CARE FIRST HOUR: CPT

## 2018-08-31 PROCEDURE — 71045 X-RAY EXAM CHEST 1 VIEW: CPT | Mod: 26

## 2018-08-31 RX ORDER — CHLORHEXIDINE GLUCONATE 213 G/1000ML
1 SOLUTION TOPICAL DAILY
Qty: 0 | Refills: 0 | Status: DISCONTINUED | OUTPATIENT
Start: 2018-08-31 | End: 2018-09-04

## 2018-08-31 RX ORDER — POLYETHYLENE GLYCOL 3350 17 G/17G
17 POWDER, FOR SOLUTION ORAL DAILY
Qty: 0 | Refills: 0 | Status: DISCONTINUED | OUTPATIENT
Start: 2018-08-31 | End: 2018-09-04

## 2018-08-31 RX ORDER — ALBUMIN HUMAN 25 %
250 VIAL (ML) INTRAVENOUS ONCE
Qty: 0 | Refills: 0 | Status: COMPLETED | OUTPATIENT
Start: 2018-08-31 | End: 2018-08-31

## 2018-08-31 RX ORDER — ACETAMINOPHEN 500 MG
650 TABLET ORAL EVERY 6 HOURS
Qty: 0 | Refills: 0 | Status: DISCONTINUED | OUTPATIENT
Start: 2018-08-31 | End: 2018-09-04

## 2018-08-31 RX ORDER — DEXMEDETOMIDINE HYDROCHLORIDE IN 0.9% SODIUM CHLORIDE 4 UG/ML
0.3 INJECTION INTRAVENOUS
Qty: 200 | Refills: 0 | Status: DISCONTINUED | OUTPATIENT
Start: 2018-08-31 | End: 2018-08-31

## 2018-08-31 RX ORDER — DEXTROSE 50 % IN WATER 50 %
50 SYRINGE (ML) INTRAVENOUS
Qty: 0 | Refills: 0 | Status: DISCONTINUED | OUTPATIENT
Start: 2018-08-31 | End: 2018-09-04

## 2018-08-31 RX ORDER — ACETAMINOPHEN 500 MG
1000 TABLET ORAL ONCE
Qty: 0 | Refills: 0 | Status: COMPLETED | OUTPATIENT
Start: 2018-08-31 | End: 2018-08-31

## 2018-08-31 RX ORDER — VASOPRESSIN 20 [USP'U]/ML
0.1 INJECTION INTRAVENOUS
Qty: 100 | Refills: 0 | Status: DISCONTINUED | OUTPATIENT
Start: 2018-08-31 | End: 2018-08-31

## 2018-08-31 RX ORDER — ALBUMIN HUMAN 25 %
250 VIAL (ML) INTRAVENOUS
Qty: 0 | Refills: 0 | Status: COMPLETED | OUTPATIENT
Start: 2018-08-31 | End: 2018-08-31

## 2018-08-31 RX ORDER — CALCIUM GLUCONATE 100 MG/ML
2 VIAL (ML) INTRAVENOUS ONCE
Qty: 0 | Refills: 0 | Status: COMPLETED | OUTPATIENT
Start: 2018-08-31 | End: 2018-08-31

## 2018-08-31 RX ORDER — SODIUM CHLORIDE 9 MG/ML
1000 INJECTION INTRAMUSCULAR; INTRAVENOUS; SUBCUTANEOUS
Qty: 0 | Refills: 0 | Status: DISCONTINUED | OUTPATIENT
Start: 2018-08-31 | End: 2018-09-04

## 2018-08-31 RX ORDER — HEPARIN SODIUM 5000 [USP'U]/ML
5000 INJECTION INTRAVENOUS; SUBCUTANEOUS EVERY 8 HOURS
Qty: 0 | Refills: 0 | Status: DISCONTINUED | OUTPATIENT
Start: 2018-08-31 | End: 2018-09-03

## 2018-08-31 RX ORDER — SENNA PLUS 8.6 MG/1
2 TABLET ORAL AT BEDTIME
Qty: 0 | Refills: 0 | Status: DISCONTINUED | OUTPATIENT
Start: 2018-08-31 | End: 2018-09-04

## 2018-08-31 RX ORDER — INSULIN HUMAN 100 [IU]/ML
1 INJECTION, SOLUTION SUBCUTANEOUS
Qty: 100 | Refills: 0 | Status: DISCONTINUED | OUTPATIENT
Start: 2018-08-31 | End: 2018-09-01

## 2018-08-31 RX ORDER — INSULIN LISPRO 100/ML
VIAL (ML) SUBCUTANEOUS
Qty: 0 | Refills: 0 | Status: DISCONTINUED | OUTPATIENT
Start: 2018-08-31 | End: 2018-09-01

## 2018-08-31 RX ORDER — SODIUM CHLORIDE 9 MG/ML
1000 INJECTION, SOLUTION INTRAVENOUS
Qty: 0 | Refills: 0 | Status: DISCONTINUED | OUTPATIENT
Start: 2018-08-31 | End: 2018-09-04

## 2018-08-31 RX ORDER — DOCUSATE SODIUM 100 MG
100 CAPSULE ORAL THREE TIMES A DAY
Qty: 0 | Refills: 0 | Status: DISCONTINUED | OUTPATIENT
Start: 2018-08-31 | End: 2018-09-04

## 2018-08-31 RX ORDER — VASOPRESSIN 20 [USP'U]/ML
0.03 INJECTION INTRAVENOUS
Qty: 100 | Refills: 0 | Status: DISCONTINUED | OUTPATIENT
Start: 2018-08-31 | End: 2018-08-31

## 2018-08-31 RX ORDER — DEXTROSE 50 % IN WATER 50 %
15 SYRINGE (ML) INTRAVENOUS ONCE
Qty: 0 | Refills: 0 | Status: DISCONTINUED | OUTPATIENT
Start: 2018-08-31 | End: 2018-09-04

## 2018-08-31 RX ORDER — FUROSEMIDE 40 MG
20 TABLET ORAL ONCE
Qty: 0 | Refills: 0 | Status: COMPLETED | OUTPATIENT
Start: 2018-08-31 | End: 2018-08-31

## 2018-08-31 RX ORDER — CEFAZOLIN SODIUM 1 G
2000 VIAL (EA) INJECTION EVERY 8 HOURS
Qty: 0 | Refills: 0 | Status: DISCONTINUED | OUTPATIENT
Start: 2018-08-31 | End: 2018-08-31

## 2018-08-31 RX ORDER — DEXTROSE 50 % IN WATER 50 %
25 SYRINGE (ML) INTRAVENOUS
Qty: 0 | Refills: 0 | Status: DISCONTINUED | OUTPATIENT
Start: 2018-08-31 | End: 2018-09-04

## 2018-08-31 RX ORDER — POTASSIUM CHLORIDE 20 MEQ
20 PACKET (EA) ORAL
Qty: 0 | Refills: 0 | Status: COMPLETED | OUTPATIENT
Start: 2018-08-31 | End: 2018-08-31

## 2018-08-31 RX ORDER — ASPIRIN/CALCIUM CARB/MAGNESIUM 324 MG
81 TABLET ORAL DAILY
Qty: 0 | Refills: 0 | Status: DISCONTINUED | OUTPATIENT
Start: 2018-08-31 | End: 2018-09-04

## 2018-08-31 RX ORDER — PANTOPRAZOLE SODIUM 20 MG/1
40 TABLET, DELAYED RELEASE ORAL DAILY
Qty: 0 | Refills: 0 | Status: DISCONTINUED | OUTPATIENT
Start: 2018-08-31 | End: 2018-08-31

## 2018-08-31 RX ORDER — MEPERIDINE HYDROCHLORIDE 50 MG/ML
25 INJECTION INTRAMUSCULAR; INTRAVENOUS; SUBCUTANEOUS ONCE
Qty: 0 | Refills: 0 | Status: DISCONTINUED | OUTPATIENT
Start: 2018-08-31 | End: 2018-08-31

## 2018-08-31 RX ORDER — PHENYLEPHRINE HYDROCHLORIDE 10 MG/ML
0.1 INJECTION INTRAVENOUS
Qty: 40 | Refills: 0 | Status: DISCONTINUED | OUTPATIENT
Start: 2018-08-31 | End: 2018-08-31

## 2018-08-31 RX ORDER — CLEVIDIPINE BUTYRATE 50MG/100ML
2.5 VIAL (ML) INTRAVENOUS
Qty: 25 | Refills: 0 | Status: DISCONTINUED | OUTPATIENT
Start: 2018-08-31 | End: 2018-09-01

## 2018-08-31 RX ORDER — GLUCAGON INJECTION, SOLUTION 0.5 MG/.1ML
1 INJECTION, SOLUTION SUBCUTANEOUS ONCE
Qty: 0 | Refills: 0 | Status: DISCONTINUED | OUTPATIENT
Start: 2018-08-31 | End: 2018-09-04

## 2018-08-31 RX ORDER — CHLORHEXIDINE GLUCONATE 213 G/1000ML
5 SOLUTION TOPICAL EVERY 4 HOURS
Qty: 0 | Refills: 0 | Status: DISCONTINUED | OUTPATIENT
Start: 2018-08-31 | End: 2018-08-31

## 2018-08-31 RX ORDER — POTASSIUM CHLORIDE 20 MEQ
20 PACKET (EA) ORAL ONCE
Qty: 0 | Refills: 0 | Status: COMPLETED | OUTPATIENT
Start: 2018-08-31 | End: 2018-08-31

## 2018-08-31 RX ORDER — CEFAZOLIN SODIUM 1 G
2000 VIAL (EA) INJECTION EVERY 8 HOURS
Qty: 0 | Refills: 0 | Status: COMPLETED | OUTPATIENT
Start: 2018-08-31 | End: 2018-09-01

## 2018-08-31 RX ADMIN — Medication 125 MILLILITER(S): at 01:40

## 2018-08-31 RX ADMIN — Medication 20 MILLIGRAM(S): at 01:39

## 2018-08-31 RX ADMIN — Medication 200 GRAM(S): at 13:12

## 2018-08-31 RX ADMIN — Medication 125 MILLILITER(S): at 12:11

## 2018-08-31 RX ADMIN — HEPARIN SODIUM 5000 UNIT(S): 5000 INJECTION INTRAVENOUS; SUBCUTANEOUS at 22:46

## 2018-08-31 RX ADMIN — Medication 12.5 MILLIGRAM(S): at 05:22

## 2018-08-31 RX ADMIN — Medication 2000 MILLIGRAM(S): at 16:10

## 2018-08-31 RX ADMIN — Medication 1000 MILLIGRAM(S): at 16:00

## 2018-08-31 RX ADMIN — Medication 100 MILLIEQUIVALENT(S): at 13:13

## 2018-08-31 RX ADMIN — Medication 50 MILLIEQUIVALENT(S): at 18:00

## 2018-08-31 RX ADMIN — PANTOPRAZOLE SODIUM 40 MILLIGRAM(S): 20 TABLET, DELAYED RELEASE ORAL at 13:17

## 2018-08-31 RX ADMIN — Medication 125 MILLILITER(S): at 13:12

## 2018-08-31 RX ADMIN — CHLORHEXIDINE GLUCONATE 10 MILLILITER(S): 213 SOLUTION TOPICAL at 05:25

## 2018-08-31 RX ADMIN — LIDOCAINE 1 PATCH: 4 CREAM TOPICAL at 05:00

## 2018-08-31 RX ADMIN — FAMOTIDINE 20 MILLIGRAM(S): 10 INJECTION INTRAVENOUS at 05:22

## 2018-08-31 RX ADMIN — Medication 50 MILLIEQUIVALENT(S): at 16:00

## 2018-08-31 RX ADMIN — CHLORHEXIDINE GLUCONATE 1 APPLICATION(S): 213 SOLUTION TOPICAL at 05:23

## 2018-08-31 RX ADMIN — Medication 400 MILLIGRAM(S): at 15:10

## 2018-08-31 RX ADMIN — Medication 100 MILLIEQUIVALENT(S): at 00:53

## 2018-08-31 RX ADMIN — CHLORHEXIDINE GLUCONATE 1 APPLICATION(S): 213 SOLUTION TOPICAL at 05:22

## 2018-08-31 NOTE — BRIEF OPERATIVE NOTE - PROCEDURE
<<-----Click on this checkbox to enter Procedure Mitral valve repair  08/31/2018    Active  MODONOGUE

## 2018-08-31 NOTE — PROGRESS NOTE ADULT - SUBJECTIVE AND OBJECTIVE BOX
67 yo Cantonese speaking Female with PAST MEDICAL & SURGICAL HISTORY: Mitral regurgitation, Anemia, HTN, presented with CP, SOB, VARNER.   Echo 8/29 severe pHTN (PAP 86), moderate TR. Cath showed non obstructive CAD.    8/31 s/p Mini thoracotomy MV repair, V wires, Right pleural tube. Arrived to ICU intubated on Gato at 0.1 and Vaso gtt.    vital Signs Last 24 Hrs  T(C): 35.7 (31 Aug 2018 05:01), Max: 36.4 (30 Aug 2018 21:04)  T(F): 96.2 (31 Aug 2018 05:01), Max: 97.5 (30 Aug 2018 21:04)  HR: 64 (31 Aug 2018 11:35) (64 - 108)  BP: 135/83 (31 Aug 2018 06:06) (135/83 - 135/83)  BP(mean): --  RR: 12 (31 Aug 2018 11:30) (12 - 29)  SpO2: 100% (31 Aug 2018 11:35) (98% - 100%)    not awake   clean chest tube site    bedside echo with no left pleural effusion      a/p:    wean to extubate  wean pressors  labs and CXR now  NPO at this time, IVF  glycemic and pain control  DVT, GI proph      50 minutes of critical care time spent providing medical care for patient's acute illness/conditions that impairs at least one vital organ system and/or poses a high risk of imminent or life threatening deterioration in the patient's condition. It includes time spent evaluating and treating the patient's acute illness as well as time spent reviewing labs, radiology, discussing goals of care with patient and/or patient's family, and discussing the case with a multidisciplinary team in an effort to prevent further life threatening deterioration or end organ damage. This time is independent of any procedures performed.

## 2018-09-01 LAB
ALBUMIN SERPL ELPH-MCNC: 3.4 G/DL — SIGNIFICANT CHANGE UP (ref 3.3–5)
ALBUMIN SERPL ELPH-MCNC: 3.6 G/DL — SIGNIFICANT CHANGE UP (ref 3.3–5)
ALBUMIN SERPL ELPH-MCNC: 3.8 G/DL — SIGNIFICANT CHANGE UP (ref 3.3–5)
ALP SERPL-CCNC: 36 U/L — LOW (ref 40–120)
ALP SERPL-CCNC: 40 U/L — SIGNIFICANT CHANGE UP (ref 40–120)
ALP SERPL-CCNC: 44 U/L — SIGNIFICANT CHANGE UP (ref 40–120)
ALT FLD-CCNC: 28 U/L — SIGNIFICANT CHANGE UP (ref 10–45)
ALT FLD-CCNC: 40 U/L — SIGNIFICANT CHANGE UP (ref 10–45)
ALT FLD-CCNC: 49 U/L — HIGH (ref 10–45)
ANION GAP SERPL CALC-SCNC: 13 MMOL/L — SIGNIFICANT CHANGE UP (ref 5–17)
ANION GAP SERPL CALC-SCNC: 14 MMOL/L — SIGNIFICANT CHANGE UP (ref 5–17)
ANION GAP SERPL CALC-SCNC: 9 MMOL/L — SIGNIFICANT CHANGE UP (ref 5–17)
APTT BLD: 31.6 SEC — SIGNIFICANT CHANGE UP (ref 27.5–37.4)
APTT BLD: 32 SEC — SIGNIFICANT CHANGE UP (ref 27.5–37.4)
APTT BLD: 33.4 SEC — SIGNIFICANT CHANGE UP (ref 27.5–37.4)
AST SERPL-CCNC: 33 U/L — SIGNIFICANT CHANGE UP (ref 10–40)
AST SERPL-CCNC: 40 U/L — SIGNIFICANT CHANGE UP (ref 10–40)
AST SERPL-CCNC: 49 U/L — HIGH (ref 10–40)
BILIRUB SERPL-MCNC: 1.1 MG/DL — SIGNIFICANT CHANGE UP (ref 0.2–1.2)
BILIRUB SERPL-MCNC: 1.2 MG/DL — SIGNIFICANT CHANGE UP (ref 0.2–1.2)
BILIRUB SERPL-MCNC: 1.5 MG/DL — HIGH (ref 0.2–1.2)
BUN SERPL-MCNC: 17 MG/DL — SIGNIFICANT CHANGE UP (ref 7–23)
BUN SERPL-MCNC: 20 MG/DL — SIGNIFICANT CHANGE UP (ref 7–23)
BUN SERPL-MCNC: 23 MG/DL — SIGNIFICANT CHANGE UP (ref 7–23)
CALCIUM SERPL-MCNC: 8.4 MG/DL — SIGNIFICANT CHANGE UP (ref 8.4–10.5)
CALCIUM SERPL-MCNC: 8.7 MG/DL — SIGNIFICANT CHANGE UP (ref 8.4–10.5)
CALCIUM SERPL-MCNC: 8.8 MG/DL — SIGNIFICANT CHANGE UP (ref 8.4–10.5)
CHLORIDE SERPL-SCNC: 100 MMOL/L — SIGNIFICANT CHANGE UP (ref 96–108)
CO2 SERPL-SCNC: 26 MMOL/L — SIGNIFICANT CHANGE UP (ref 22–31)
CO2 SERPL-SCNC: 27 MMOL/L — SIGNIFICANT CHANGE UP (ref 22–31)
CO2 SERPL-SCNC: 29 MMOL/L — SIGNIFICANT CHANGE UP (ref 22–31)
CREAT SERPL-MCNC: 0.6 MG/DL — SIGNIFICANT CHANGE UP (ref 0.5–1.3)
CREAT SERPL-MCNC: 0.61 MG/DL — SIGNIFICANT CHANGE UP (ref 0.5–1.3)
CREAT SERPL-MCNC: 0.62 MG/DL — SIGNIFICANT CHANGE UP (ref 0.5–1.3)
GAS PNL BLDA: SIGNIFICANT CHANGE UP
GLUCOSE BLDC GLUCOMTR-MCNC: 139 MG/DL — HIGH (ref 70–99)
GLUCOSE BLDC GLUCOMTR-MCNC: 154 MG/DL — HIGH (ref 70–99)
GLUCOSE BLDC GLUCOMTR-MCNC: 165 MG/DL — HIGH (ref 70–99)
GLUCOSE BLDC GLUCOMTR-MCNC: 204 MG/DL — HIGH (ref 70–99)
GLUCOSE SERPL-MCNC: 151 MG/DL — HIGH (ref 70–99)
GLUCOSE SERPL-MCNC: 153 MG/DL — HIGH (ref 70–99)
GLUCOSE SERPL-MCNC: 245 MG/DL — HIGH (ref 70–99)
HCT VFR BLD CALC: 27.2 % — LOW (ref 34.5–45)
HCT VFR BLD CALC: 29.1 % — LOW (ref 34.5–45)
HCT VFR BLD CALC: 30.2 % — LOW (ref 34.5–45)
HGB BLD-MCNC: 8.4 G/DL — LOW (ref 11.5–15.5)
HGB BLD-MCNC: 9.1 G/DL — LOW (ref 11.5–15.5)
HGB BLD-MCNC: 9.2 G/DL — LOW (ref 11.5–15.5)
INR BLD: 1.23 — HIGH (ref 0.88–1.16)
INR BLD: 1.26 — HIGH (ref 0.88–1.16)
INR BLD: 1.3 — HIGH (ref 0.88–1.16)
LACTATE SERPL-SCNC: 0.9 MMOL/L — SIGNIFICANT CHANGE UP (ref 0.5–2)
LACTATE SERPL-SCNC: 1 MMOL/L — SIGNIFICANT CHANGE UP (ref 0.5–2)
LACTATE SERPL-SCNC: 1 MMOL/L — SIGNIFICANT CHANGE UP (ref 0.5–2)
MAGNESIUM SERPL-MCNC: 2.2 MG/DL — SIGNIFICANT CHANGE UP (ref 1.6–2.6)
MAGNESIUM SERPL-MCNC: 2.3 MG/DL — SIGNIFICANT CHANGE UP (ref 1.6–2.6)
MAGNESIUM SERPL-MCNC: 2.3 MG/DL — SIGNIFICANT CHANGE UP (ref 1.6–2.6)
MCHC RBC-ENTMCNC: 22.6 PG — LOW (ref 27–34)
MCHC RBC-ENTMCNC: 23 PG — LOW (ref 27–34)
MCHC RBC-ENTMCNC: 23.2 PG — LOW (ref 27–34)
MCHC RBC-ENTMCNC: 30.5 G/DL — LOW (ref 32–36)
MCHC RBC-ENTMCNC: 30.9 G/DL — LOW (ref 32–36)
MCHC RBC-ENTMCNC: 31.3 G/DL — LOW (ref 32–36)
MCV RBC AUTO: 74 FL — LOW (ref 80–100)
MCV RBC AUTO: 74.2 FL — LOW (ref 80–100)
MCV RBC AUTO: 74.3 FL — LOW (ref 80–100)
PHOSPHATE SERPL-MCNC: 2.4 MG/DL — LOW (ref 2.5–4.5)
PHOSPHATE SERPL-MCNC: 3.5 MG/DL — SIGNIFICANT CHANGE UP (ref 2.5–4.5)
PHOSPHATE SERPL-MCNC: 3.9 MG/DL — SIGNIFICANT CHANGE UP (ref 2.5–4.5)
PLATELET # BLD AUTO: 101 K/UL — LOW (ref 150–400)
PLATELET # BLD AUTO: 77 K/UL — LOW (ref 150–400)
PLATELET # BLD AUTO: 78 K/UL — LOW (ref 150–400)
POTASSIUM SERPL-MCNC: 4.1 MMOL/L — SIGNIFICANT CHANGE UP (ref 3.5–5.3)
POTASSIUM SERPL-MCNC: 4.2 MMOL/L — SIGNIFICANT CHANGE UP (ref 3.5–5.3)
POTASSIUM SERPL-MCNC: 4.3 MMOL/L — SIGNIFICANT CHANGE UP (ref 3.5–5.3)
POTASSIUM SERPL-SCNC: 4.1 MMOL/L — SIGNIFICANT CHANGE UP (ref 3.5–5.3)
POTASSIUM SERPL-SCNC: 4.2 MMOL/L — SIGNIFICANT CHANGE UP (ref 3.5–5.3)
POTASSIUM SERPL-SCNC: 4.3 MMOL/L — SIGNIFICANT CHANGE UP (ref 3.5–5.3)
PROT SERPL-MCNC: 5.5 G/DL — LOW (ref 6–8.3)
PROT SERPL-MCNC: 5.8 G/DL — LOW (ref 6–8.3)
PROT SERPL-MCNC: 5.9 G/DL — LOW (ref 6–8.3)
PROTHROM AB SERPL-ACNC: 13.7 SEC — HIGH (ref 9.8–12.7)
PROTHROM AB SERPL-ACNC: 14 SEC — HIGH (ref 9.8–12.7)
PROTHROM AB SERPL-ACNC: 14.5 SEC — HIGH (ref 9.8–12.7)
RBC # BLD: 3.66 M/UL — LOW (ref 3.8–5.2)
RBC # BLD: 3.93 M/UL — SIGNIFICANT CHANGE UP (ref 3.8–5.2)
RBC # BLD: 4.07 M/UL — SIGNIFICANT CHANGE UP (ref 3.8–5.2)
RBC # FLD: 16.4 % — SIGNIFICANT CHANGE UP (ref 10.3–16.9)
RBC # FLD: 16.6 % — SIGNIFICANT CHANGE UP (ref 10.3–16.9)
RBC # FLD: 16.8 % — SIGNIFICANT CHANGE UP (ref 10.3–16.9)
SODIUM SERPL-SCNC: 138 MMOL/L — SIGNIFICANT CHANGE UP (ref 135–145)
SODIUM SERPL-SCNC: 140 MMOL/L — SIGNIFICANT CHANGE UP (ref 135–145)
SODIUM SERPL-SCNC: 140 MMOL/L — SIGNIFICANT CHANGE UP (ref 135–145)
WBC # BLD: 6.8 K/UL — SIGNIFICANT CHANGE UP (ref 3.8–10.5)
WBC # BLD: 8.3 K/UL — SIGNIFICANT CHANGE UP (ref 3.8–10.5)
WBC # BLD: 9.8 K/UL — SIGNIFICANT CHANGE UP (ref 3.8–10.5)
WBC # FLD AUTO: 6.8 K/UL — SIGNIFICANT CHANGE UP (ref 3.8–10.5)
WBC # FLD AUTO: 8.3 K/UL — SIGNIFICANT CHANGE UP (ref 3.8–10.5)
WBC # FLD AUTO: 9.8 K/UL — SIGNIFICANT CHANGE UP (ref 3.8–10.5)

## 2018-09-01 PROCEDURE — 71045 X-RAY EXAM CHEST 1 VIEW: CPT | Mod: 26

## 2018-09-01 PROCEDURE — 99291 CRITICAL CARE FIRST HOUR: CPT

## 2018-09-01 PROCEDURE — 71045 X-RAY EXAM CHEST 1 VIEW: CPT | Mod: 26,77

## 2018-09-01 RX ORDER — AMIODARONE HYDROCHLORIDE 400 MG/1
150 TABLET ORAL ONCE
Qty: 0 | Refills: 0 | Status: COMPLETED | OUTPATIENT
Start: 2018-09-01 | End: 2018-09-01

## 2018-09-01 RX ORDER — AMIODARONE HYDROCHLORIDE 400 MG/1
0.5 TABLET ORAL
Qty: 900 | Refills: 0 | Status: DISCONTINUED | OUTPATIENT
Start: 2018-09-01 | End: 2018-09-01

## 2018-09-01 RX ORDER — METOPROLOL TARTRATE 50 MG
12.5 TABLET ORAL EVERY 6 HOURS
Qty: 0 | Refills: 0 | Status: DISCONTINUED | OUTPATIENT
Start: 2018-09-01 | End: 2018-09-03

## 2018-09-01 RX ORDER — ALBUMIN HUMAN 25 %
250 VIAL (ML) INTRAVENOUS ONCE
Qty: 0 | Refills: 0 | Status: COMPLETED | OUTPATIENT
Start: 2018-09-01 | End: 2018-09-01

## 2018-09-01 RX ORDER — ACETAMINOPHEN 500 MG
750 TABLET ORAL ONCE
Qty: 0 | Refills: 0 | Status: COMPLETED | OUTPATIENT
Start: 2018-09-01 | End: 2018-09-01

## 2018-09-01 RX ORDER — INSULIN LISPRO 100/ML
VIAL (ML) SUBCUTANEOUS
Qty: 0 | Refills: 0 | Status: DISCONTINUED | OUTPATIENT
Start: 2018-09-01 | End: 2018-09-04

## 2018-09-01 RX ORDER — KETOROLAC TROMETHAMINE 30 MG/ML
15 SYRINGE (ML) INJECTION ONCE
Qty: 0 | Refills: 0 | Status: DISCONTINUED | OUTPATIENT
Start: 2018-09-01 | End: 2018-09-01

## 2018-09-01 RX ORDER — ONDANSETRON 8 MG/1
4 TABLET, FILM COATED ORAL ONCE
Qty: 0 | Refills: 0 | Status: COMPLETED | OUTPATIENT
Start: 2018-09-01 | End: 2018-09-01

## 2018-09-01 RX ADMIN — Medication 12.5 MILLIGRAM(S): at 07:50

## 2018-09-01 RX ADMIN — AMIODARONE HYDROCHLORIDE 133.33 MILLIGRAM(S): 400 TABLET ORAL at 09:30

## 2018-09-01 RX ADMIN — Medication 100 MILLIGRAM(S): at 15:50

## 2018-09-01 RX ADMIN — Medication 81 MILLIGRAM(S): at 11:48

## 2018-09-01 RX ADMIN — Medication 300 MILLIGRAM(S): at 05:39

## 2018-09-01 RX ADMIN — Medication 125 MILLILITER(S): at 16:01

## 2018-09-01 RX ADMIN — Medication 12.5 MILLIGRAM(S): at 23:01

## 2018-09-01 RX ADMIN — Medication 12.5 MILLIGRAM(S): at 17:31

## 2018-09-01 RX ADMIN — Medication 2: at 11:47

## 2018-09-01 RX ADMIN — ONDANSETRON 4 MILLIGRAM(S): 8 TABLET, FILM COATED ORAL at 07:14

## 2018-09-01 RX ADMIN — Medication 2000 MILLIGRAM(S): at 07:15

## 2018-09-01 RX ADMIN — Medication 2: at 22:59

## 2018-09-01 RX ADMIN — Medication 2000 MILLIGRAM(S): at 00:01

## 2018-09-01 RX ADMIN — Medication 2000 MILLIGRAM(S): at 16:00

## 2018-09-01 RX ADMIN — HEPARIN SODIUM 5000 UNIT(S): 5000 INJECTION INTRAVENOUS; SUBCUTANEOUS at 06:51

## 2018-09-01 RX ADMIN — CHLORHEXIDINE GLUCONATE 1 APPLICATION(S): 213 SOLUTION TOPICAL at 15:50

## 2018-09-01 RX ADMIN — HEPARIN SODIUM 5000 UNIT(S): 5000 INJECTION INTRAVENOUS; SUBCUTANEOUS at 22:59

## 2018-09-01 RX ADMIN — HEPARIN SODIUM 5000 UNIT(S): 5000 INJECTION INTRAVENOUS; SUBCUTANEOUS at 15:57

## 2018-09-01 RX ADMIN — Medication 2000 MILLIGRAM(S): at 23:01

## 2018-09-01 RX ADMIN — Medication 100 MILLIGRAM(S): at 06:51

## 2018-09-01 RX ADMIN — Medication 750 MILLIGRAM(S): at 06:00

## 2018-09-01 RX ADMIN — Medication 2 MILLIGRAM(S): at 15:58

## 2018-09-01 RX ADMIN — Medication 15 MILLIGRAM(S): at 07:15

## 2018-09-01 RX ADMIN — Medication 15 MILLIGRAM(S): at 07:30

## 2018-09-01 RX ADMIN — AMIODARONE HYDROCHLORIDE 600 MILLIGRAM(S): 400 TABLET ORAL at 03:25

## 2018-09-01 NOTE — CHART NOTE - NSCHARTNOTEFT_GEN_A_CORE
CT Removal:    Pt seen and examined at bedside.  Case discussed with Dr. Raya.  Minimal output from right pleural CT.  No air leak appreciated.  CT removed without incident per Dr. Raya request.  tie down secured and occlusive DSD placed.  CXR - official report pending, possible small right apical PTX, w/ f/u 4PM CXR. Dr. Everett aware,  Pt tolerated procedure well.

## 2018-09-01 NOTE — PROGRESS NOTE ADULT - SUBJECTIVE AND OBJECTIVE BOX
CTICU  CRITICAL  CARE  attending     Hand off received @ 7a					   Pertinent clinical, laboratory, radiographic, hemodynamic, echocardiographic, respiratory data, microbiologic data and chart were reviewed and analyzed frequently throughout the course of the day and night  Patient seen and examined with CTS/ SH attending at bedside    Pt is a 68y , Female, post op day # 1 s/p Mitral Valve repair    post op:    extubated  acute post hemorrhagic anemia  thrombocytopenia      Mitral regurgitation: Mitral regurgitation      , FAMILY HISTORY:  No pertinent family history in first degree relatives  PAST MEDICAL & SURGICAL HISTORY:  Mitral regurgitation  Anemia  HTN (hypertension)  No significant past surgical history    Patient is a 68y old  Female who presents with a chief complaint of Severe mitral regurgitation (29 Aug 2018 14:27)      14 system review was unremarkable  acute changes include acute respiratory failure  Vital signs, hemodynamic and respiratory parameters were reviewed from the bedside nursing flowsheet.  ICU Vital Signs Last 24 Hrs  T(C): 36.6 (01 Sep 2018 13:32), Max: 37 (01 Sep 2018 10:10)  T(F): 97.8 (01 Sep 2018 13:32), Max: 98.6 (01 Sep 2018 10:10)  HR: 60 (01 Sep 2018 14:00) (60 - 78)  BP: --  BP(mean): --  ABP: 106/78 (01 Sep 2018 14:00) (106/78 - 148/64)  ABP(mean): 94 (01 Sep 2018 14:00) (66 - 96)  RR: 35 (01 Sep 2018 14:00) (15 - 35)  SpO2: 95% (01 Sep 2018 14:00) (95% - 100%)    Adult Advanced Hemodynamics Last 24 Hrs  CVP(mm Hg): 3 (01 Sep 2018 06:00) (-2 - 3)  CVP(cm H2O): --  CO: --  CI: --  PA: --  PA(mean): --  PCWP: --  SVR: --  SVRI: --  PVR: --  PVRI: --, ABG - ( 01 Sep 2018 10:28 )  pH, Arterial: 7.42  pH, Blood: x     /  pCO2: 44    /  pO2: 167   / HCO3: 28    / Base Excess: 3.1   /  SaO2: 100                 Intake and output was reviewed and the fluid balance was calculated  Daily     Daily Weight in k.5 (01 Sep 2018 13:14)  I&O's Summary    31 Aug 2018 07:01  -  01 Sep 2018 07:00  --------------------------------------------------------  IN: 2013.5 mL / OUT: 2595 mL / NET: -581.5 mL    01 Sep 2018 07:  -  01 Sep 2018 15:55  --------------------------------------------------------  IN: 810 mL / OUT: 170 mL / NET: 640 mL        All lines and drain sites were assessed  [facility]      POCT Blood Glucose.: 204 mg/dL (01 Sep 2018 11:44)    No acute change in mental status  Auscultation of the chest reveals equal bs  Abdomen is soft  Extremities are warm and well perfused  Wounds appear clean and unremarkable  Antibiotics are periop    labs  CBC Full  -  ( 01 Sep 2018 11:02 )  WBC Count : 8.3 K/uL  Hemoglobin : 9.1 g/dL  Hematocrit : 29.1 %  Platelet Count - Automated : 78 K/uL  Mean Cell Volume : 74.0 fL  Mean Cell Hemoglobin : 23.2 pg  Mean Cell Hemoglobin Concentration : 31.3 g/dL  Auto Neutrophil # : x  Auto Lymphocyte # : x  Auto Monocyte # : x  Auto Eosinophil # : x  Auto Basophil # : x  Auto Neutrophil % : x  Auto Lymphocyte % : x  Auto Monocyte % : x  Auto Eosinophil % : x  Auto Basophil % : x        140  |  100  |  23  ----------------------------<  245<H>  4.1   |  27  |  0.62    Ca    8.4      01 Sep 2018 10:27  Phos  3.9       Mg     2.3         TPro  5.5<L>  /  Alb  3.6  /  TBili  1.2  /  DBili  x   /  AST  40  /  ALT  40  /  AlkPhos  40      PT/INR - ( 01 Sep 2018 10:27 )   PT: 14.5 sec;   INR: 1.30          PTT - ( 01 Sep 2018 10:27 )  PTT:32.0 sec  The current medications were reviewed   MEDICATIONS  (STANDING):  albumin human  5% IVPB 250 milliLiter(s) IV Intermittent once  amiodarone Infusion 0.5 mG/Min (16.667 mL/Hr) IV Continuous <Continuous>  aspirin enteric coated 81 milliGRAM(s) Oral daily  ceFAZolin  Injectable. 2000 milliGRAM(s) IV Push every 8 hours  chlorhexidine 2% Cloths 1 Application(s) Topical daily  clevidipine Infusion 2.5 mG/Hr (5 mL/Hr) IV Continuous <Continuous>  dextrose 5%. 1000 milliLiter(s) (50 mL/Hr) IV Continuous <Continuous>  dextrose 50% Injectable 50 milliLiter(s) IV Push every 15 minutes  dextrose 50% Injectable 25 milliLiter(s) IV Push every 15 minutes  docusate sodium 100 milliGRAM(s) Oral three times a day  heparin  Injectable 5000 Unit(s) SubCutaneous every 8 hours  insulin lispro (HumaLOG) corrective regimen sliding scale   SubCutaneous Before meals and at bedtime  metoprolol tartrate 12.5 milliGRAM(s) Oral every 6 hours  senna 2 Tablet(s) Oral at bedtime  sodium chloride 0.9%. 1000 milliLiter(s) (10 mL/Hr) IV Continuous <Continuous>  testosterone patch 2 mG/24 Hr(s) 2 milliGRAM(s) Transdermal daily    MEDICATIONS  (PRN):  acetaminophen   Tablet 650 milliGRAM(s) Oral every 6 hours PRN mild pain  dextrose 40% Gel 15 Gram(s) Oral once PRN Blood Glucose LESS THAN 70 milliGRAM(s)/deciliter  glucagon  Injectable 1 milliGRAM(s) IntraMuscular once PRN Glucose LESS THAN 70 milligrams/deciliter  polyethylene glycol 3350 17 Gram(s) Oral daily PRN Constipation       PROBLEM LIST/ ASSESSMENT:  HEALTH ISSUES - PROBLEM Dx:  acute post hemorrhagic anemia  thrombocytopenia  s/p MV repair  Mitral regurgitation: Mitral regurgitation      ,   Patient is a 68y old  Female who presents with a chief complaint of Severe mitral regurgitation (29 Aug 2018 14:27)     s/p MV repair      My plan includes :  close hemodynamic, ventilatory and drain monitoring and management per post op routine    Monitor for arrhythmias and monitor parameters for organ perfusion  monitor neurologic status  Head of the bed should remain elevated to 45 deg .   chest PT and IS will be encouraged  monitor adequacy of oxygenation and ventilation and attempt to wean oxygen  Nutritional goals will be met using po eventually , ensure adequate caloric intake and montior the same  Stress ulcer and VTE prophylaxis will be achieved    Glycemic control is satisfactory  Electrolytes have been repleted as necessary and wound care has been carried out. Pain control has been achieved.   agressive physical therapy and early mobility and ambulation goals will be met   The family was updated about the course and plan  CRITICAL CARE TIME SPENT in evaluation and management, reassessments, review and interpretation of labs and x-rays, ventilator and hemodynamic management, formulating a plan and coordinating care: __50____ MIN.  Time does not include procedural time.  CTICU ATTENDING     					    Brandyn Everett MD

## 2018-09-01 NOTE — PHYSICAL THERAPY INITIAL EVALUATION ADULT - GENERAL OBSERVATIONS, REHAB EVAL
Patient received sitting out of bed in chair, no apparent distress, +telemetry, +hep lock, +triple lumen catheter, +temporary pacemaker, +chest tube, +4LNC, + radial arterial line.

## 2018-09-01 NOTE — DIETITIAN INITIAL EVALUATION ADULT. - ENERGY NEEDS
Ht 152.4cm; Wt 53.5Kg  IBW 45.5Kg; %%  BMI 23    Utilized ABW to calculate needs, pt falls within % of IBW. Adjusted for post-op/age.

## 2018-09-01 NOTE — DIETITIAN INITIAL EVALUATION ADULT. - OTHER INFO
67y/o F s/p mini MV replacement. She is seen resting in chair on NC with son at beside who translates for pt. Pt with a poor appetite and intake post-op. She refused breakfast today c/o being hot and nauseated. Nausea has now resolved and pt denies emesis and mechanical issues. RN informed RD of elevated BG and PA also aware. PTA son reports pt with a fair appetite and intake (which is normal) and denies wt changes. Pt eats a regular diet. Discussed need for adequate intake and recommending supplementation; pt will trial. RD to follow per policy and provide heart healthy diet education as appropriate.

## 2018-09-01 NOTE — PHYSICAL THERAPY INITIAL EVALUATION ADULT - PERTINENT HX OF CURRENT PROBLEM, REHAB EVAL
68 year old female started having CP/SOB intermittently over the past week, worse with exertion, NYHA class III symptoms, however she is now having symptoms at rest, NYHA class IV.  Her chest pain was substernal, non-radiating, intermittent, self-limiting and severe in nature.

## 2018-09-01 NOTE — DIETITIAN INITIAL EVALUATION ADULT. - PROBLEM SELECTOR PLAN 1
Admit to 9E/ICU under Dr. Raya.    Plan for cardiac cath tonight as an add-on; Cath team aware of urgency.  Keep NPO.    Will semi-electively intubate her for the cath due to her inability to lie flat.    Diurese with IV lasix.   BP/HR control as needed.  Will defer to ICU team/intensivist.    Patient's family was updated and aware of her needing surgery.  Mitral valve repair/replacement and TV repair/replacement tomorrow with Dr. Raya, 1st case.    Maintain active T&S.  Blood / blood products placed on hold.   Pre-op orders placed.  Consent to follow, pending cath.  ?Carotid dopplers if able to obtain, however getting the cath, intubating and placing arterial line/TLC are most important next steps at this time.

## 2018-09-02 LAB
ALBUMIN SERPL ELPH-MCNC: 3.2 G/DL — LOW (ref 3.3–5)
ALBUMIN SERPL ELPH-MCNC: 3.5 G/DL — SIGNIFICANT CHANGE UP (ref 3.3–5)
ALP SERPL-CCNC: 37 U/L — LOW (ref 40–120)
ALP SERPL-CCNC: 40 U/L — SIGNIFICANT CHANGE UP (ref 40–120)
ALT FLD-CCNC: 19 U/L — SIGNIFICANT CHANGE UP (ref 10–45)
ALT FLD-CCNC: 25 U/L — SIGNIFICANT CHANGE UP (ref 10–45)
ANION GAP SERPL CALC-SCNC: 12 MMOL/L — SIGNIFICANT CHANGE UP (ref 5–17)
ANION GAP SERPL CALC-SCNC: 8 MMOL/L — SIGNIFICANT CHANGE UP (ref 5–17)
APTT BLD: 40.4 SEC — HIGH (ref 27.5–37.4)
AST SERPL-CCNC: 22 U/L — SIGNIFICANT CHANGE UP (ref 10–40)
AST SERPL-CCNC: 28 U/L — SIGNIFICANT CHANGE UP (ref 10–40)
BILIRUB SERPL-MCNC: 0.8 MG/DL — SIGNIFICANT CHANGE UP (ref 0.2–1.2)
BILIRUB SERPL-MCNC: 1 MG/DL — SIGNIFICANT CHANGE UP (ref 0.2–1.2)
BUN SERPL-MCNC: 10 MG/DL — SIGNIFICANT CHANGE UP (ref 7–23)
BUN SERPL-MCNC: 14 MG/DL — SIGNIFICANT CHANGE UP (ref 7–23)
CALCIUM SERPL-MCNC: 8.5 MG/DL — SIGNIFICANT CHANGE UP (ref 8.4–10.5)
CALCIUM SERPL-MCNC: 8.6 MG/DL — SIGNIFICANT CHANGE UP (ref 8.4–10.5)
CHLORIDE SERPL-SCNC: 100 MMOL/L — SIGNIFICANT CHANGE UP (ref 96–108)
CHLORIDE SERPL-SCNC: 102 MMOL/L — SIGNIFICANT CHANGE UP (ref 96–108)
CO2 SERPL-SCNC: 26 MMOL/L — SIGNIFICANT CHANGE UP (ref 22–31)
CO2 SERPL-SCNC: 29 MMOL/L — SIGNIFICANT CHANGE UP (ref 22–31)
CREAT SERPL-MCNC: 0.49 MG/DL — LOW (ref 0.5–1.3)
CREAT SERPL-MCNC: 0.5 MG/DL — SIGNIFICANT CHANGE UP (ref 0.5–1.3)
GAS PNL BLDA: SIGNIFICANT CHANGE UP
GLUCOSE BLDC GLUCOMTR-MCNC: 100 MG/DL — HIGH (ref 70–99)
GLUCOSE BLDC GLUCOMTR-MCNC: 119 MG/DL — HIGH (ref 70–99)
GLUCOSE BLDC GLUCOMTR-MCNC: 144 MG/DL — HIGH (ref 70–99)
GLUCOSE SERPL-MCNC: 100 MG/DL — HIGH (ref 70–99)
GLUCOSE SERPL-MCNC: 90 MG/DL — SIGNIFICANT CHANGE UP (ref 70–99)
HCT VFR BLD CALC: 26.3 % — LOW (ref 34.5–45)
HGB BLD-MCNC: 8.3 G/DL — LOW (ref 11.5–15.5)
INR BLD: 1.16 — SIGNIFICANT CHANGE UP (ref 0.88–1.16)
LACTATE SERPL-SCNC: 0.9 MMOL/L — SIGNIFICANT CHANGE UP (ref 0.5–2)
MAGNESIUM SERPL-MCNC: 2.1 MG/DL — SIGNIFICANT CHANGE UP (ref 1.6–2.6)
MAGNESIUM SERPL-MCNC: 2.2 MG/DL — SIGNIFICANT CHANGE UP (ref 1.6–2.6)
MCHC RBC-ENTMCNC: 23.2 PG — LOW (ref 27–34)
MCHC RBC-ENTMCNC: 31.6 G/DL — LOW (ref 32–36)
MCV RBC AUTO: 73.5 FL — LOW (ref 80–100)
PHOSPHATE SERPL-MCNC: 2.2 MG/DL — LOW (ref 2.5–4.5)
PHOSPHATE SERPL-MCNC: 2.4 MG/DL — LOW (ref 2.5–4.5)
PLATELET # BLD AUTO: 85 K/UL — LOW (ref 150–400)
POTASSIUM SERPL-MCNC: 4 MMOL/L — SIGNIFICANT CHANGE UP (ref 3.5–5.3)
POTASSIUM SERPL-MCNC: 4.1 MMOL/L — SIGNIFICANT CHANGE UP (ref 3.5–5.3)
POTASSIUM SERPL-SCNC: 4 MMOL/L — SIGNIFICANT CHANGE UP (ref 3.5–5.3)
POTASSIUM SERPL-SCNC: 4.1 MMOL/L — SIGNIFICANT CHANGE UP (ref 3.5–5.3)
PROT SERPL-MCNC: 5.6 G/DL — LOW (ref 6–8.3)
PROT SERPL-MCNC: 5.7 G/DL — LOW (ref 6–8.3)
PROTHROM AB SERPL-ACNC: 12.9 SEC — HIGH (ref 9.8–12.7)
RBC # BLD: 3.58 M/UL — LOW (ref 3.8–5.2)
RBC # FLD: 16.2 % — SIGNIFICANT CHANGE UP (ref 10.3–16.9)
SODIUM SERPL-SCNC: 137 MMOL/L — SIGNIFICANT CHANGE UP (ref 135–145)
SODIUM SERPL-SCNC: 140 MMOL/L — SIGNIFICANT CHANGE UP (ref 135–145)
WBC # BLD: 6.7 K/UL — SIGNIFICANT CHANGE UP (ref 3.8–10.5)
WBC # FLD AUTO: 6.7 K/UL — SIGNIFICANT CHANGE UP (ref 3.8–10.5)

## 2018-09-02 PROCEDURE — 71045 X-RAY EXAM CHEST 1 VIEW: CPT | Mod: 26

## 2018-09-02 PROCEDURE — 99291 CRITICAL CARE FIRST HOUR: CPT

## 2018-09-02 RX ORDER — SODIUM,POTASSIUM PHOSPHATES 278-250MG
2 POWDER IN PACKET (EA) ORAL ONCE
Qty: 0 | Refills: 0 | Status: COMPLETED | OUTPATIENT
Start: 2018-09-02 | End: 2018-09-02

## 2018-09-02 RX ORDER — POTASSIUM CHLORIDE 20 MEQ
20 PACKET (EA) ORAL ONCE
Qty: 0 | Refills: 0 | Status: COMPLETED | OUTPATIENT
Start: 2018-09-02 | End: 2018-09-02

## 2018-09-02 RX ADMIN — HEPARIN SODIUM 5000 UNIT(S): 5000 INJECTION INTRAVENOUS; SUBCUTANEOUS at 22:37

## 2018-09-02 RX ADMIN — Medication 12.5 MILLIGRAM(S): at 06:41

## 2018-09-02 RX ADMIN — Medication 2 MILLIGRAM(S): at 13:28

## 2018-09-02 RX ADMIN — Medication 12.5 MILLIGRAM(S): at 18:24

## 2018-09-02 RX ADMIN — Medication 650 MILLIGRAM(S): at 06:41

## 2018-09-02 RX ADMIN — Medication 12.5 MILLIGRAM(S): at 23:36

## 2018-09-02 RX ADMIN — HEPARIN SODIUM 5000 UNIT(S): 5000 INJECTION INTRAVENOUS; SUBCUTANEOUS at 13:28

## 2018-09-02 RX ADMIN — Medication 12.5 MILLIGRAM(S): at 13:29

## 2018-09-02 RX ADMIN — Medication 81 MILLIGRAM(S): at 13:28

## 2018-09-02 RX ADMIN — Medication 20 MILLIEQUIVALENT(S): at 06:40

## 2018-09-02 RX ADMIN — Medication 650 MILLIGRAM(S): at 22:30

## 2018-09-02 RX ADMIN — Medication 2 MILLIGRAM(S): at 12:21

## 2018-09-02 RX ADMIN — HEPARIN SODIUM 5000 UNIT(S): 5000 INJECTION INTRAVENOUS; SUBCUTANEOUS at 06:41

## 2018-09-02 RX ADMIN — Medication 2 TABLET(S): at 06:40

## 2018-09-02 RX ADMIN — Medication 100 MILLIGRAM(S): at 06:41

## 2018-09-02 NOTE — PROGRESS NOTE ADULT - SUBJECTIVE AND OBJECTIVE BOX
CTICU  CRITICAL  CARE  attending     Hand off received @ 7a					   Pertinent clinical, laboratory, radiographic, hemodynamic, echocardiographic, respiratory data, microbiologic data and chart were reviewed and analyzed frequently throughout the course of the day and night  Patient seen and examined with CTS/ SH attending at bedside    Pt is a 68y , Female, post op day # 2 s/p MV repair    today:    acute post hemorrhagic anemia (Hct 26.3)  consider transfusion if becomes hemodynamically unstable.            Mitral regurgitation: Mitral regurgitation      , FAMILY HISTORY:  No pertinent family history in first degree relatives  PAST MEDICAL & SURGICAL HISTORY:  Mitral regurgitation  Anemia  HTN (hypertension)  No significant past surgical history    Patient is a 68y old  Female who presents with a chief complaint of Severe mitral regurgitation (29 Aug 2018 14:27)      14 system review was unremarkable  acute changes include acute respiratory failure  Vital signs, hemodynamic and respiratory parameters were reviewed from the bedside nursing flowsheet.  ICU Vital Signs Last 24 Hrs  T(C): 36.6 (02 Sep 2018 09:16), Max: 36.8 (01 Sep 2018 17:08)  T(F): 97.8 (02 Sep 2018 09:16), Max: 98.2 (01 Sep 2018 17:08)  HR: 62 (02 Sep 2018 11:00) (60 - 76)  BP: 123/62 (01 Sep 2018 20:20) (118/45 - 123/62)  BP(mean): 84 (01 Sep 2018 20:20) (64 - 84)  ABP: 106/48 (02 Sep 2018 11:00) (106/48 - 168/54)  ABP(mean): 64 (02 Sep 2018 11:00) (64 - 94)  RR: 25 (02 Sep 2018 11:00) (22 - 42)  SpO2: 95% (02 Sep 2018 11:00) (91% - 100%)    Adult Advanced Hemodynamics Last 24 Hrs  CVP(mm Hg): --  CVP(cm H2O): --  CO: --  CI: --  PA: --  PA(mean): --  PCWP: --  SVR: --  SVRI: --  PVR: --  PVRI: --, ABG - ( 02 Sep 2018 03:29 )  pH, Arterial: 7.47  pH, Blood: x     /  pCO2: 42    /  pO2: 150   / HCO3: 30    / Base Excess: 5.7   /  SaO2: 99                  Intake and output was reviewed and the fluid balance was calculated  Daily     Daily Weight in k.5 (01 Sep 2018 13:14)  I&O's Summary    01 Sep 2018 07:01  -  02 Sep 2018 07:00  --------------------------------------------------------  IN: 1345 mL / OUT: 920 mL / NET: 425 mL    02 Sep 2018 07:  -  02 Sep 2018 12:11  --------------------------------------------------------  IN: 0 mL / OUT: 201 mL / NET: -201 mL        All lines and drain sites were assessed  Glycemic trend was reviewedCAPILLARY BLOOD GLUCOSE      POCT Blood Glucose.: 119 mg/dL (02 Sep 2018 11:09)    No acute change in mental status  Auscultation of the chest reveals equal bs  Abdomen is soft  Extremities are warm and well perfused  Wounds appear clean and unremarkable  Antibiotics are periop    labs  CBC Full  -  ( 02 Sep 2018 03:46 )  WBC Count : 6.7 K/uL  Hemoglobin : 8.3 g/dL  Hematocrit : 26.3 %  Platelet Count - Automated : 85 K/uL  Mean Cell Volume : 73.5 fL  Mean Cell Hemoglobin : 23.2 pg  Mean Cell Hemoglobin Concentration : 31.6 g/dL  Auto Neutrophil # : x  Auto Lymphocyte # : x  Auto Monocyte # : x  Auto Eosinophil # : x  Auto Basophil # : x  Auto Neutrophil % : x  Auto Lymphocyte % : x  Auto Monocyte % : x  Auto Eosinophil % : x  Auto Basophil % : x        140  |  102  |  14  ----------------------------<  90  4.0   |  26  |  0.49<L>    Ca    8.5      02 Sep 2018 03:49  Phos  2.2       Mg     2.2         TPro  5.6<L>  /  Alb  3.5  /  TBili  1.0  /  DBili  x   /  AST  28  /  ALT  25  /  AlkPhos  37<L>      PT/INR - ( 02 Sep 2018 03:49 )   PT: 12.9 sec;   INR: 1.16          PTT - ( 02 Sep 2018 03:49 )  PTT:40.4 sec  The current medications were reviewed   MEDICATIONS  (STANDING):  aspirin enteric coated 81 milliGRAM(s) Oral daily  chlorhexidine 2% Cloths 1 Application(s) Topical daily  dextrose 5%. 1000 milliLiter(s) (50 mL/Hr) IV Continuous <Continuous>  dextrose 50% Injectable 50 milliLiter(s) IV Push every 15 minutes  dextrose 50% Injectable 25 milliLiter(s) IV Push every 15 minutes  docusate sodium 100 milliGRAM(s) Oral three times a day  heparin  Injectable 5000 Unit(s) SubCutaneous every 8 hours  insulin lispro (HumaLOG) corrective regimen sliding scale   SubCutaneous Before meals and at bedtime  metoprolol tartrate 12.5 milliGRAM(s) Oral every 6 hours  senna 2 Tablet(s) Oral at bedtime  sodium chloride 0.9%. 1000 milliLiter(s) (10 mL/Hr) IV Continuous <Continuous>  testosterone patch 2 mG/24 Hr(s) 2 milliGRAM(s) Transdermal daily    MEDICATIONS  (PRN):  acetaminophen   Tablet 650 milliGRAM(s) Oral every 6 hours PRN mild pain  dextrose 40% Gel 15 Gram(s) Oral once PRN Blood Glucose LESS THAN 70 milliGRAM(s)/deciliter  glucagon  Injectable 1 milliGRAM(s) IntraMuscular once PRN Glucose LESS THAN 70 milligrams/deciliter  polyethylene glycol 3350 17 Gram(s) Oral daily PRN Constipation       PROBLEM LIST/ ASSESSMENT:  HEALTH ISSUES - PROBLEM Dx:  acute post hemorrhagic anemia  's/p MV repair  Mitral regurgitation: Mitral regurgitation      ,   Patient is a 68y old  Female who presents with a chief complaint of Severe mitral regurgitation (29 Aug 2018 14:27)     s/p MV repair      My plan includes :  close hemodynamic, ventilatory and drain monitoring and management per post op routine    Monitor for arrhythmias and monitor parameters for organ perfusion  monitor neurologic status  Head of the bed should remain elevated to 45 deg .   chest PT and IS will be encouraged  monitor adequacy of oxygenation and ventilation and attempt to wean oxygen  Nutritional goals will be met using po eventually , ensure adequate caloric intake and montior the same  Stress ulcer and VTE prophylaxis will be achieved    Glycemic control is satisfactory  Electrolytes have been repleted as necessary and wound care has been carried out. Pain control has been achieved.   agressive physical therapy and early mobility and ambulation goals will be met   The family was updated about the course and plan  CRITICAL CARE TIME SPENT in evaluation and management, reassessments, review and interpretation of labs and x-rays, ventilator and hemodynamic management, formulating a plan and coordinating care: 45_ MIN.  Time does not include procedural time.  CTICU ATTENDING     					    Brandyn Everett MD

## 2018-09-03 LAB
ALBUMIN SERPL ELPH-MCNC: 3.3 G/DL — SIGNIFICANT CHANGE UP (ref 3.3–5)
ALP SERPL-CCNC: 41 U/L — SIGNIFICANT CHANGE UP (ref 40–120)
ALT FLD-CCNC: 20 U/L — SIGNIFICANT CHANGE UP (ref 10–45)
ANION GAP SERPL CALC-SCNC: 8 MMOL/L — SIGNIFICANT CHANGE UP (ref 5–17)
APTT BLD: 45.7 SEC — HIGH (ref 27.5–37.4)
APTT BLD: 52.6 SEC — HIGH (ref 27.5–37.4)
AST SERPL-CCNC: 22 U/L — SIGNIFICANT CHANGE UP (ref 10–40)
BILIRUB SERPL-MCNC: 0.8 MG/DL — SIGNIFICANT CHANGE UP (ref 0.2–1.2)
BUN SERPL-MCNC: 10 MG/DL — SIGNIFICANT CHANGE UP (ref 7–23)
CALCIUM SERPL-MCNC: 8.6 MG/DL — SIGNIFICANT CHANGE UP (ref 8.4–10.5)
CHLORIDE SERPL-SCNC: 102 MMOL/L — SIGNIFICANT CHANGE UP (ref 96–108)
CO2 SERPL-SCNC: 29 MMOL/L — SIGNIFICANT CHANGE UP (ref 22–31)
CREAT SERPL-MCNC: 0.51 MG/DL — SIGNIFICANT CHANGE UP (ref 0.5–1.3)
CULTURE RESULTS: SIGNIFICANT CHANGE UP
CULTURE RESULTS: SIGNIFICANT CHANGE UP
GLUCOSE BLDC GLUCOMTR-MCNC: 115 MG/DL — HIGH (ref 70–99)
GLUCOSE BLDC GLUCOMTR-MCNC: 145 MG/DL — HIGH (ref 70–99)
GLUCOSE BLDC GLUCOMTR-MCNC: 171 MG/DL — HIGH (ref 70–99)
GLUCOSE BLDC GLUCOMTR-MCNC: 92 MG/DL — SIGNIFICANT CHANGE UP (ref 70–99)
GLUCOSE SERPL-MCNC: 101 MG/DL — HIGH (ref 70–99)
HCT VFR BLD CALC: 25.9 % — LOW (ref 34.5–45)
HCT VFR BLD CALC: 27 % — LOW (ref 34.5–45)
HGB BLD-MCNC: 8.2 G/DL — LOW (ref 11.5–15.5)
HGB BLD-MCNC: 8.5 G/DL — LOW (ref 11.5–15.5)
INR BLD: 1.02 — SIGNIFICANT CHANGE UP (ref 0.88–1.16)
INR BLD: 1.06 — SIGNIFICANT CHANGE UP (ref 0.88–1.16)
LACTATE SERPL-SCNC: 0.9 MMOL/L — SIGNIFICANT CHANGE UP (ref 0.5–2)
MAGNESIUM SERPL-MCNC: 2.1 MG/DL — SIGNIFICANT CHANGE UP (ref 1.6–2.6)
MCHC RBC-ENTMCNC: 23.1 PG — LOW (ref 27–34)
MCHC RBC-ENTMCNC: 23.3 PG — LOW (ref 27–34)
MCHC RBC-ENTMCNC: 31.5 G/DL — LOW (ref 32–36)
MCHC RBC-ENTMCNC: 31.7 G/DL — LOW (ref 32–36)
MCV RBC AUTO: 73.4 FL — LOW (ref 80–100)
MCV RBC AUTO: 73.6 FL — LOW (ref 80–100)
PHOSPHATE SERPL-MCNC: 2.6 MG/DL — SIGNIFICANT CHANGE UP (ref 2.5–4.5)
PLATELET # BLD AUTO: 116 K/UL — LOW (ref 150–400)
PLATELET # BLD AUTO: 123 K/UL — LOW (ref 150–400)
POTASSIUM SERPL-MCNC: 4.1 MMOL/L — SIGNIFICANT CHANGE UP (ref 3.5–5.3)
POTASSIUM SERPL-SCNC: 4.1 MMOL/L — SIGNIFICANT CHANGE UP (ref 3.5–5.3)
PROT SERPL-MCNC: 5.6 G/DL — LOW (ref 6–8.3)
PROTHROM AB SERPL-ACNC: 11.3 SEC — SIGNIFICANT CHANGE UP (ref 9.8–12.7)
PROTHROM AB SERPL-ACNC: 11.8 SEC — SIGNIFICANT CHANGE UP (ref 9.8–12.7)
RBC # BLD: 3.52 M/UL — LOW (ref 3.8–5.2)
RBC # BLD: 3.68 M/UL — LOW (ref 3.8–5.2)
RBC # FLD: 15.9 % — SIGNIFICANT CHANGE UP (ref 10.3–16.9)
RBC # FLD: 16 % — SIGNIFICANT CHANGE UP (ref 10.3–16.9)
SODIUM SERPL-SCNC: 139 MMOL/L — SIGNIFICANT CHANGE UP (ref 135–145)
SPECIMEN SOURCE: SIGNIFICANT CHANGE UP
SPECIMEN SOURCE: SIGNIFICANT CHANGE UP
WBC # BLD: 7.1 K/UL — SIGNIFICANT CHANGE UP (ref 3.8–10.5)
WBC # BLD: 7.3 K/UL — SIGNIFICANT CHANGE UP (ref 3.8–10.5)
WBC # FLD AUTO: 7.1 K/UL — SIGNIFICANT CHANGE UP (ref 3.8–10.5)
WBC # FLD AUTO: 7.3 K/UL — SIGNIFICANT CHANGE UP (ref 3.8–10.5)

## 2018-09-03 PROCEDURE — 71045 X-RAY EXAM CHEST 1 VIEW: CPT | Mod: 26

## 2018-09-03 PROCEDURE — 99291 CRITICAL CARE FIRST HOUR: CPT

## 2018-09-03 RX ORDER — METOPROLOL TARTRATE 50 MG
12.5 TABLET ORAL ONCE
Qty: 0 | Refills: 0 | Status: COMPLETED | OUTPATIENT
Start: 2018-09-03 | End: 2018-09-03

## 2018-09-03 RX ORDER — HEPARIN SODIUM 5000 [USP'U]/ML
5000 INJECTION INTRAVENOUS; SUBCUTANEOUS EVERY 12 HOURS
Qty: 0 | Refills: 0 | Status: DISCONTINUED | OUTPATIENT
Start: 2018-09-03 | End: 2018-09-04

## 2018-09-03 RX ORDER — METOPROLOL TARTRATE 50 MG
25 TABLET ORAL EVERY 12 HOURS
Qty: 0 | Refills: 0 | Status: DISCONTINUED | OUTPATIENT
Start: 2018-09-03 | End: 2018-09-04

## 2018-09-03 RX ORDER — SIMVASTATIN 20 MG/1
20 TABLET, FILM COATED ORAL AT BEDTIME
Qty: 0 | Refills: 0 | Status: DISCONTINUED | OUTPATIENT
Start: 2018-09-03 | End: 2018-09-04

## 2018-09-03 RX ORDER — FUROSEMIDE 40 MG
10 TABLET ORAL DAILY
Qty: 0 | Refills: 0 | Status: DISCONTINUED | OUTPATIENT
Start: 2018-09-03 | End: 2018-09-04

## 2018-09-03 RX ADMIN — Medication 12.5 MILLIGRAM(S): at 07:08

## 2018-09-03 RX ADMIN — HEPARIN SODIUM 5000 UNIT(S): 5000 INJECTION INTRAVENOUS; SUBCUTANEOUS at 18:00

## 2018-09-03 RX ADMIN — Medication 2 MILLIGRAM(S): at 12:51

## 2018-09-03 RX ADMIN — Medication 2: at 11:59

## 2018-09-03 RX ADMIN — Medication 25 MILLIGRAM(S): at 19:54

## 2018-09-03 RX ADMIN — SENNA PLUS 2 TABLET(S): 8.6 TABLET ORAL at 21:30

## 2018-09-03 RX ADMIN — Medication 10 MILLIGRAM(S): at 10:22

## 2018-09-03 RX ADMIN — Medication 12.5 MILLIGRAM(S): at 10:22

## 2018-09-03 RX ADMIN — Medication 81 MILLIGRAM(S): at 11:02

## 2018-09-03 RX ADMIN — CHLORHEXIDINE GLUCONATE 1 APPLICATION(S): 213 SOLUTION TOPICAL at 07:08

## 2018-09-03 RX ADMIN — SIMVASTATIN 20 MILLIGRAM(S): 20 TABLET, FILM COATED ORAL at 21:30

## 2018-09-03 RX ADMIN — HEPARIN SODIUM 5000 UNIT(S): 5000 INJECTION INTRAVENOUS; SUBCUTANEOUS at 07:08

## 2018-09-03 NOTE — PROGRESS NOTE ADULT - SUBJECTIVE AND OBJECTIVE BOX
67 yo Cantonese speaking Female with PAST MEDICAL & SURGICAL HISTORY: Mitral regurgitation, Anemia, HTN, presented with CP, SOB, VARNER. Echo 8/29 severe pHTN (PAP 86), moderate TR. Cath showed non obstructive CAD.    8/31 s/p Mini thoracotomy MV repair, V wires, Right pleural tube. Arrived to ICU intubated on Gato at 0.1 and Vaso gtt  Extubated on post operatively. Off chemical hemodynamic support  No events overnight  Afebrile      Vital Signs Last 24 Hrs  T(C): 36.4 (03 Sep 2018 06:00), Max: 36.7 (02 Sep 2018 18:02)  T(F): 97.5 (03 Sep 2018 06:00), Max: 98.1 (02 Sep 2018 18:02)  HR: 66 (03 Sep 2018 07:00) (60 - 70)  BP: 120/61 (03 Sep 2018 07:00) (92/51 - 131/62)  BP(mean): 71 (03 Sep 2018 07:00) (64 - 88)  RR: 20 (03 Sep 2018 07:00) (17 - 28)  SpO2: 100% (03 Sep 2018 07:00) (94% - 100%)    alert, awake, verbal  follows commands  Right IJ TLC- site is clean  Thoracotomy incision is clean  Right groin sutures, no swelling/hematoma in groin b/l  Moves all 4 ext    CXR reviewed- small b/l pleural effusions, b/l congestion.  Lactate, Blood: 0.9 mmoL/L (09-03-18 @ 04:05)      a/p:    remove Central line, remove right groin suture  PO  ASA, Statin and beta blocker at 25 mg BID  Lasix daily, goal is slightly negative fluid balance  Monitor lytes  OOB to chair  Pain control PRN  Glycemic control, DVT proph  PT for Ambulation      35 minutes of critical care time spent providing medical care for patient's acute illness/conditions that impairs at least one vital organ system and/or poses a high risk of imminent or life threatening deterioration in the patient's condition. It includes time spent evaluating and treating the patient's acute illness as well as time spent reviewing labs, radiology, discussing goals of care with patient and/or patient's family, and discussing the case with a multidisciplinary team in an effort to prevent further life threatening deterioration or end organ damage. This time is independent of any procedures performed.

## 2018-09-04 ENCOUNTER — TRANSCRIPTION ENCOUNTER (OUTPATIENT)
Age: 68
End: 2018-09-04

## 2018-09-04 VITALS — TEMPERATURE: 98 F

## 2018-09-04 PROBLEM — I10 ESSENTIAL (PRIMARY) HYPERTENSION: Chronic | Status: ACTIVE | Noted: 2018-08-29

## 2018-09-04 PROBLEM — D64.9 ANEMIA, UNSPECIFIED: Chronic | Status: ACTIVE | Noted: 2018-08-29

## 2018-09-04 PROBLEM — I34.0 NONRHEUMATIC MITRAL (VALVE) INSUFFICIENCY: Chronic | Status: ACTIVE | Noted: 2018-08-29

## 2018-09-04 LAB
ANION GAP SERPL CALC-SCNC: 12 MMOL/L — SIGNIFICANT CHANGE UP (ref 5–17)
BUN SERPL-MCNC: 9 MG/DL — SIGNIFICANT CHANGE UP (ref 7–23)
CALCIUM SERPL-MCNC: 8.7 MG/DL — SIGNIFICANT CHANGE UP (ref 8.4–10.5)
CHLORIDE SERPL-SCNC: 102 MMOL/L — SIGNIFICANT CHANGE UP (ref 96–108)
CO2 SERPL-SCNC: 27 MMOL/L — SIGNIFICANT CHANGE UP (ref 22–31)
CREAT SERPL-MCNC: 0.45 MG/DL — LOW (ref 0.5–1.3)
GLUCOSE BLDC GLUCOMTR-MCNC: 114 MG/DL — HIGH (ref 70–99)
GLUCOSE BLDC GLUCOMTR-MCNC: 82 MG/DL — SIGNIFICANT CHANGE UP (ref 70–99)
GLUCOSE SERPL-MCNC: 97 MG/DL — SIGNIFICANT CHANGE UP (ref 70–99)
HCT VFR BLD CALC: 27.6 % — LOW (ref 34.5–45)
HGB BLD-MCNC: 8.6 G/DL — LOW (ref 11.5–15.5)
MAGNESIUM SERPL-MCNC: 1.9 MG/DL — SIGNIFICANT CHANGE UP (ref 1.6–2.6)
MCHC RBC-ENTMCNC: 22.9 PG — LOW (ref 27–34)
MCHC RBC-ENTMCNC: 31.2 G/DL — LOW (ref 32–36)
MCV RBC AUTO: 73.6 FL — LOW (ref 80–100)
PLATELET # BLD AUTO: 151 K/UL — SIGNIFICANT CHANGE UP (ref 150–400)
POTASSIUM SERPL-MCNC: 3.6 MMOL/L — SIGNIFICANT CHANGE UP (ref 3.5–5.3)
POTASSIUM SERPL-SCNC: 3.6 MMOL/L — SIGNIFICANT CHANGE UP (ref 3.5–5.3)
RBC # BLD: 3.75 M/UL — LOW (ref 3.8–5.2)
RBC # FLD: 15.7 % — SIGNIFICANT CHANGE UP (ref 10.3–16.9)
SODIUM SERPL-SCNC: 141 MMOL/L — SIGNIFICANT CHANGE UP (ref 135–145)
WBC # BLD: 5.7 K/UL — SIGNIFICANT CHANGE UP (ref 3.8–10.5)
WBC # FLD AUTO: 5.7 K/UL — SIGNIFICANT CHANGE UP (ref 3.8–10.5)

## 2018-09-04 PROCEDURE — 71046 X-RAY EXAM CHEST 2 VIEWS: CPT | Mod: 26

## 2018-09-04 PROCEDURE — 93306 TTE W/DOPPLER COMPLETE: CPT | Mod: 26

## 2018-09-04 RX ORDER — SENNA PLUS 8.6 MG/1
2 TABLET ORAL
Qty: 60 | Refills: 0 | OUTPATIENT
Start: 2018-09-04 | End: 2018-10-03

## 2018-09-04 RX ORDER — METOPROLOL TARTRATE 50 MG
1 TABLET ORAL
Qty: 0 | Refills: 0 | COMMUNITY

## 2018-09-04 RX ORDER — METOPROLOL TARTRATE 50 MG
1 TABLET ORAL
Qty: 30 | Refills: 0 | OUTPATIENT
Start: 2018-09-04 | End: 2018-10-03

## 2018-09-04 RX ORDER — DIMENHYDRINATE 50 MG
50 TABLET ORAL ONCE
Qty: 0 | Refills: 0 | Status: COMPLETED | OUTPATIENT
Start: 2018-09-04 | End: 2018-09-04

## 2018-09-04 RX ORDER — ASPIRIN/CALCIUM CARB/MAGNESIUM 324 MG
1 TABLET ORAL
Qty: 30 | Refills: 0 | OUTPATIENT
Start: 2018-09-04 | End: 2018-10-03

## 2018-09-04 RX ORDER — SIMVASTATIN 20 MG/1
1 TABLET, FILM COATED ORAL
Qty: 30 | Refills: 0 | OUTPATIENT
Start: 2018-09-04 | End: 2018-10-03

## 2018-09-04 RX ORDER — DOCUSATE SODIUM 100 MG
1 CAPSULE ORAL
Qty: 90 | Refills: 0 | OUTPATIENT
Start: 2018-09-04 | End: 2018-10-03

## 2018-09-04 RX ORDER — POTASSIUM CHLORIDE 20 MEQ
1 PACKET (EA) ORAL
Qty: 14 | Refills: 0 | OUTPATIENT
Start: 2018-09-04 | End: 2018-09-17

## 2018-09-04 RX ORDER — INFLUENZA VIRUS VACCINE 15; 15; 15; 15 UG/.5ML; UG/.5ML; UG/.5ML; UG/.5ML
0.5 SUSPENSION INTRAMUSCULAR ONCE
Qty: 0 | Refills: 0 | Status: COMPLETED | OUTPATIENT
Start: 2018-09-04 | End: 2018-09-04

## 2018-09-04 RX ORDER — ACETAMINOPHEN 500 MG
2 TABLET ORAL
Qty: 240 | Refills: 0 | OUTPATIENT
Start: 2018-09-04 | End: 2018-10-03

## 2018-09-04 RX ORDER — FUROSEMIDE 40 MG
0.5 TABLET ORAL
Qty: 7 | Refills: 0 | OUTPATIENT
Start: 2018-09-04 | End: 2018-09-17

## 2018-09-04 RX ORDER — POTASSIUM CHLORIDE 20 MEQ
40 PACKET (EA) ORAL ONCE
Qty: 0 | Refills: 0 | Status: COMPLETED | OUTPATIENT
Start: 2018-09-04 | End: 2018-09-04

## 2018-09-04 RX ORDER — MAGNESIUM OXIDE 400 MG ORAL TABLET 241.3 MG
800 TABLET ORAL ONCE
Qty: 0 | Refills: 0 | Status: COMPLETED | OUTPATIENT
Start: 2018-09-04 | End: 2018-09-04

## 2018-09-04 RX ADMIN — Medication 50 MILLIGRAM(S): at 13:05

## 2018-09-04 RX ADMIN — Medication 40 MILLIEQUIVALENT(S): at 07:58

## 2018-09-04 RX ADMIN — HEPARIN SODIUM 5000 UNIT(S): 5000 INJECTION INTRAVENOUS; SUBCUTANEOUS at 06:16

## 2018-09-04 RX ADMIN — MAGNESIUM OXIDE 400 MG ORAL TABLET 800 MILLIGRAM(S): 241.3 TABLET ORAL at 07:58

## 2018-09-04 RX ADMIN — CHLORHEXIDINE GLUCONATE 1 APPLICATION(S): 213 SOLUTION TOPICAL at 06:31

## 2018-09-04 RX ADMIN — Medication 81 MILLIGRAM(S): at 11:33

## 2018-09-04 RX ADMIN — Medication 10 MILLIGRAM(S): at 06:16

## 2018-09-04 RX ADMIN — Medication 25 MILLIGRAM(S): at 06:16

## 2018-09-04 NOTE — DISCHARGE NOTE ADULT - CARE PLAN
Principal Discharge DX:	Mitral regurgitation  Goal:	Recover from Surgery  Assessment and plan of treatment:	-Please follow up with  ___on ___.  The office is located at [facility], [facility], 4th floor. Call us with any questions  #[phone].    -Walk daily as tolerated and use your incentive spirometer every hour.    -No driving or strenuous activity/exercise for 6 weeks, or until  cleared by your surgeon.    -Gently clean your incisions with anti-bacterial soap and water, pat  dry.  You may leave them open to air.    -Call your doctor if you have shortness of breath, chest pain not  relieved by pain medication, dizziness, fever >101.5, or increased  redness or drainage from incisions. Principal Discharge DX:	Mitral regurgitation  Goal:	Recover from Surgery  Assessment and plan of treatment:	-Please follow up with  ___on ___.  The office is located at [facility], [facility], 4th floor. Call us with any questions  #[phone].    -Walk daily as tolerated and use your incentive spirometer every hour.    -No driving or strenuous activity/exercise for 6 weeks, or until  cleared by your surgeon.    -Gently clean your incisions with anti-bacterial soap and water, pat  dry.  You may leave them open to air.    -Call your doctor if you have shortness of breath, chest pain not  relieved by pain medication, dizziness, fever >101.5, or increased  redness or drainage from incisions.  Goal:	Additional Follow-up Appointments Principal Discharge DX:	Mitral regurgitation  Goal:	Recover from Surgery  Assessment and plan of treatment:	-Please follow up with  ___on ___.  The office is located at [facility], [facility], 4th floor. Call us with any questions  #[phone].    -Walk daily as tolerated and use your incentive spirometer every hour.    -No driving or strenuous activity/exercise for 6 weeks, or until  cleared by your surgeon.    -Gently clean your incisions with anti-bacterial soap and water, pat  dry.  You may leave them open to air.    -Call your doctor if you have shortness of breath, chest pain not  relieved by pain medication, dizziness, fever >101.5, or increased  redness or drainage from incisions.  Goal:	Additional Follow-up Appointments  Assessment and plan of treatment:	Please follow-up with Dr. Germania Yeung on     Please follow-up with Dr. Osmel Smith on Principal Discharge DX:	Mitral regurgitation  Goal:	Recover from Surgery  Assessment and plan of treatment:	-Please follow up with Dr. Raya on 9/11/18 at 12:30pm.  The office is located at [facility], [facility], 4th floor. Call us with any questions  #[phone].    -Walk daily as tolerated and use your incentive spirometer every hour.    -No driving or strenuous activity/exercise for 6 weeks, or until  cleared by your surgeon.    -Gently clean your incisions with anti-bacterial soap and water, pat  dry.  You may leave them open to air.    -Call your doctor if you have shortness of breath, chest pain not  relieved by pain medication, dizziness, fever >101.5, or increased  redness or drainage from incisions.  Goal:	Additional Follow-up Appointments  Assessment and plan of treatment:	Please follow-up with Dr. Germania Yeung in 2-4 weeks.  Please call her office tomorrow, 9/5/18, to make an appointment. [address], [place] [address]: T: [phone]    Please follow-up with Dr. Osmel Smith in 2-4 weeks.  His office will contact you today to give you an appointment date and time. If you have not heard back by the end of the day, please call his office tomorrow, 9/5/18, to make an appointment.  [address], [place] [address]: T: [phone]

## 2018-09-04 NOTE — PROGRESS NOTE ADULT - ASSESSMENT
-Please follow up with Dr. Raya on 9/11/18 at 12:30pm.  The office is located at [facility], [facility], 4th floor. Call us with any questions  #[phone].    Please follow-up with Dr. Germania Yeung in 2-4 weeks.  Please call her office tomorrow, 9/5/18, to make an appointment. [address] [facility] [address], [place] [address]: T: [phone]    Please follow-up with Dr. Osmel Smith in 2-4 weeks.  His office will contact you today to give you an appointment date and time. If you have not heard back by the end of the day, please call his office tomorrow, 9/5/18, to make an appointment.  [address] [facility] [address], [place] [address]: T: [phone].

## 2018-09-04 NOTE — PROGRESS NOTE ADULT - SUBJECTIVE AND OBJECTIVE BOX
Patient discussed on morning rounds with Dr. Raya     Operation / Date: R mini-thoracotomy, MV repair on 8/31/18    Surgeon: Dr. Raya    Referring Physician: Dr. Smith    SUBJECTIVE ASSESSMENT:  68y Female seen at bedside this AM.  Doing well and feels ready to go home today.  Denies HA, AMS, CP, palpitations, SOB, cough, hemoptysis, n/v/d, fever.     Hospital Course:  This is a 68 year old Cantonese speaking female with history of HTN, anemia, recurrent pneumonia who initially presented to her cardiologist with NYHA Class IV symptoms, VARNER, as well as chest pain and LE edema.  ECHO at that time demonstrated mitral valve prolapse with severe MR and she was instructed to report to ED, which was disregarded at that time.  Her symptoms progressed and on 8/29/18, she presented to [facility] ED.  She was admitted for further evaluation with ECHO on 8/29/18 showing EF 70%, normal RV size and function, moderate TR, mild ND, PASP 86mmHg with severe pulm HTN.  Cardiac cath on that day revealed non-obstructive CAD.  CT Surgery was consulted, patient completed pre-operative assessment and on 8/31/18, she underwent uncomplicated Mitral Valve Repair via Right mini thoracotomy.  She arrived to CTICU in stable condition.  Cleviprex started briefly for HTN, extubated successfully on POD 0.  On POD 1, cleviprex weaned off, BB started.  Otherwise stable.  She remained HD stable with noted improvement in clinical status and was transferred to floor care on POD 3 was transferred to floor care.  She has continued to progress very well, is ambulating without assistance in the hallways and on stairs, and was medically cleared for discharge to home with plan for outpatient follow-up on POD 4.  Over 30 minutes was spent with the patient reviewing the discharge material including medications, follow up appointments, recovery, concerning symptoms, and how to contact their health care providers if they have questions.      Vital Signs Last 24 Hrs  T(C): 36.6 (04 Sep 2018 09:07), Max: 37.4 (03 Sep 2018 21:34)  T(F): 97.9 (04 Sep 2018 09:07), Max: 99.3 (03 Sep 2018 21:34)  HR: 76 (04 Sep 2018 05:01) (64 - 80)  BP: 120/70 (04 Sep 2018 05:01) (107/61 - 134/68)  BP(mean): 91 (04 Sep 2018 05:01) (77 - 101)  RR: 16 (04 Sep 2018 05:01) (14 - 24)  SpO2: 96% (04 Sep 2018 05:01) (94% - 99%)  I&O's Detail    03 Sep 2018 07:01  -  04 Sep 2018 07:00  --------------------------------------------------------  IN:    Oral Fluid: 700 mL  Total IN: 700 mL    OUT:    Voided: 2300 mL  Total OUT: 2300 mL    Total NET: -1600 mL      EPICARDIAL WIRES REMOVED: Yes  TIE DOWNS REMOVED: No.    PHYSICAL EXAM:  General: OOB to chair, no acute distress.   Neurological: AAOx3, no AMS or focal deficits.   Cardiovascular: RRR, S1/S2, no m/r/g.   Respiratory: No acute distress on RA.  CTA b/l, no w/r/r.    Gastrointestinal: ND, NBS, non-TTP.  Extremities: Warm and well perfused, no calf ttp or edema b/l.   Vascular: Pulses 2+  Incision Sites: R mini thoracotomy: C/D/I    LABS:                        8.6    5.7   )-----------( 151      ( 04 Sep 2018 05:58 )             27.6       COUMADIN:  No.    PT/INR - ( 03 Sep 2018 04:05 )   PT: 11.3 sec;   INR: 1.02          PTT - ( 03 Sep 2018 04:05 )  PTT:52.6 sec    09-04    141  |  102  |  9   ----------------------------<  97  3.6   |  27  |  0.45<L>    Ca    8.7      04 Sep 2018 05:58  Phos  2.6     09-03  Mg     1.9     09-04    TPro  5.6<L>  /  Alb  3.3  /  TBili  0.8  /  DBili  x   /  AST  22  /  ALT  20  /  AlkPhos  41  09-03    MEDICATIONS  (STANDING):  I will START or STAY ON the medications listed below when I get home from the hospital:    aspirin 81 mg oral delayed release tablet  -- 1 tab(s) by mouth once a day  -- Indication: For Blood thinner    acetaminophen 325 mg oral tablet  -- 2 tab(s) by mouth every 6 hours, As needed, mild pain MDD:8  -- Indication: For Pain    simvastatin 20 mg oral tablet  -- 1 tab(s) by mouth once a day (at bedtime)  -- Indication: For Cholesterol    Toprol-XL 25 mg oral tablet, extended release  -- 1 tab(s) by mouth once a day  -- Indication: For Blood pressure    furosemide 20 mg oral tablet  -- 0.5 tab(s) by mouth once a day   -- Avoid prolonged or excessive exposure to direct and/or artificial sunlight while taking this medication.  It is very important that you take or use this exactly as directed.  Do not skip doses or discontinue unless directed by your doctor.  It may be advisable to drink a full glass orange juice or eat a banana daily while taking this medication.    -- Indication: For Water removal pill, take with potassium    docusate sodium 100 mg oral capsule  -- 1 cap(s) by mouth 3 times a day  -- Indication: For Stool softener    senna oral tablet  -- 2 tab(s) by mouth once a day (at bedtime)  -- Indication: For Stool softener    K-Tab 10 mEq oral tablet, extended release  -- 1 tab(s) by mouth once a day   -- It is very important that you take or use this exactly as directed.  Do not skip doses or discontinue unless directed by your doctor.  Medication should be taken with plenty of water.  Take with food or milk.    -- Indication: For Potassium, take only while taking lasix.     I will STOP taking the medications listed below when I get home from the hospital:  None.     I will SWITCH the dose or number of times a day I take the medications listed below when I get home from the hospital:  None.      Discharge CXR:    < from: Xray Chest 1 View- PORTABLE-Routine (09.02.18 @ 03:03) >    EXAM:  XR CHEST PORTABLE ROUTINE 1V                          PROCEDURE DATE:  09/02/2018          INTERPRETATION:  Chest x-ray    Indication: Abnormal chest sounds    A portable frontal view of the chest is compared to the prior study dated   9/1/2018. Right IJ line is unchanged. Stable heart size. Persistent   radiographic opacity. No pneumothorax.    IMPRESSION: No significant change.      < end of copied text >    Discharge ECHO:  < from: Echocardiogram (09.04.18 @ 10:44) >  Interpretation Summary  A complete two-dimensional transthoracic echocardiogram was performed (2D,   M-mode, spectral and color flow doppler).  Study Quality: Good.Normal   left   ventricular size and wall thickness.Abnormal (paradoxical) septal motion   consistent with post-operative statusThe left ventricular ejection   fraction is   estimated to be 40-45%The left atrium is dilated.Right atrial size is   normal.The right ventricle is normal in size and function.Structurally   normal   aortic valve.There is trace to mild aortic regurgitation.An annuloplasty   ring   is noted in the mitral position.There is trace mitral   regurgitation.Structurally normal tricuspid valve.There is mild tricuspid   regurgitation.There is no echocardiographic evidence for pulmonary   hypertension.Structurally normal pulmonic valve.There is mild pulmonic   regurgitation.No aortic root dilatation.There is no pericardial effusion.    < end of copied text >

## 2018-09-04 NOTE — DISCHARGE NOTE ADULT - PLAN OF CARE
Recover from Surgery -Please follow up with  ___on ___.  The office is located at [facility], [facility], 4th floor. Call us with any questions  #[phone].    -Walk daily as tolerated and use your incentive spirometer every hour.    -No driving or strenuous activity/exercise for 6 weeks, or until  cleared by your surgeon.    -Gently clean your incisions with anti-bacterial soap and water, pat  dry.  You may leave them open to air.    -Call your doctor if you have shortness of breath, chest pain not  relieved by pain medication, dizziness, fever >101.5, or increased  redness or drainage from incisions. Additional Follow-up Appointments Please follow-up with Dr. Germania Yeung on     Please follow-up with Dr. Osmel Smtih on -Please follow up with Dr. Raya on 9/11/18 at 12:30pm.  The office is located at [facility], [facility], 4th floor. Call us with any questions  #[phone].    -Walk daily as tolerated and use your incentive spirometer every hour.    -No driving or strenuous activity/exercise for 6 weeks, or until  cleared by your surgeon.    -Gently clean your incisions with anti-bacterial soap and water, pat  dry.  You may leave them open to air.    -Call your doctor if you have shortness of breath, chest pain not  relieved by pain medication, dizziness, fever >101.5, or increased  redness or drainage from incisions. Please follow-up with Dr. Germania Yeung in 2-4 weeks.  Please call her office tomorrow, 9/5/18, to make an appointment. [address], [place] [address]: T: [phone]    Please follow-up with Dr. Osmel Smith in 2-4 weeks.  His office will contact you today to give you an appointment date and time. If you have not heard back by the end of the day, please call his office tomorrow, 9/5/18, to make an appointment.  [address], [place] [address]: T: [phone]

## 2018-09-04 NOTE — DISCHARGE NOTE ADULT - HOSPITAL COURSE
This is a 68 year old Cantonese speaking female with history of HTN, anemia, recurrent pneumonia who initially presented to her cardiologist with NYHA Class IV symptoms, VARNER, as well as chest pain and LE edema.  ECHO at that time demonstrated mitral valve prolapse with severe MR and she was instructed to report to ED, which was disregarded at that time.  Her symptoms progressed and on 8/29/18, she presented to [facility] ED.  She was admitted for further evaluation with ECHO on 8/29/18 showing EF 70%, normal RV size and function, moderate TR, mild LA, PASP 86mmHg with severe pulm HTN.  Cardiac cath on that day revealed non-obstructive CAD.  CT Surgery was consulted, patient completed pre-operative assessment and on 8/31/18, she underwent uncomplicated Mitral Valve Repair via Right mini thoracotomy.  She arrived to CTICU in stable condition.  Cleviprex started briefly for HTN, extubated successfully on POD 0.  On POD 1, cleviprex weaned off, BB started.  Otherwise stable.  She remained HD stable with noted improvement in clinical status and was transferred to floor care on POD 3 was transferred to floor care.  She has continued to progress very well, is ambulating without assistance in the hallways and on stairs, and was medically cleared for discharge to home with plan for outpatient follow-up on POD 4.  Over 30 minutes was spent with the patient reviewing the discharge material including medications, follow up appointments, recovery, concerning symptoms, and how to contact their health care providers if they have questions.

## 2018-09-04 NOTE — DISCHARGE NOTE ADULT - MEDICATION SUMMARY - MEDICATIONS TO TAKE
I will START or STAY ON the medications listed below when I get home from the hospital:    aspirin 81 mg oral delayed release tablet  -- 1 tab(s) by mouth once a day  -- Indication: For Blood thinner    acetaminophen 325 mg oral tablet  -- 2 tab(s) by mouth every 6 hours, As needed, mild pain MDD:8  -- Indication: For Pain    simvastatin 20 mg oral tablet  -- 1 tab(s) by mouth once a day (at bedtime)  -- Indication: For Cholesterol    Toprol-XL 25 mg oral tablet, extended release  -- 1 tab(s) by mouth once a day  -- Indication: For Blood pressure    furosemide 20 mg oral tablet  -- 0.5 tab(s) by mouth once a day   -- Avoid prolonged or excessive exposure to direct and/or artificial sunlight while taking this medication.  It is very important that you take or use this exactly as directed.  Do not skip doses or discontinue unless directed by your doctor.  It may be advisable to drink a full glass orange juice or eat a banana daily while taking this medication.    -- Indication: For Water removal pill, take with potassium    docusate sodium 100 mg oral capsule  -- 1 cap(s) by mouth 3 times a day  -- Indication: For Stool softener    senna oral tablet  -- 2 tab(s) by mouth once a day (at bedtime)  -- Indication: For Stool softener    K-Tab 10 mEq oral tablet, extended release  -- 1 tab(s) by mouth once a day   -- It is very important that you take or use this exactly as directed.  Do not skip doses or discontinue unless directed by your doctor.  Medication should be taken with plenty of water.  Take with food or milk.    -- Indication: For Potassium, take only while taking lasix

## 2018-09-04 NOTE — DISCHARGE NOTE ADULT - PATIENT PORTAL LINK FT
You can access the [facility] Patient Portal, offered by [facility], by registering with the following website: [email or web address]

## 2018-09-04 NOTE — PROGRESS NOTE ADULT - PROVIDER SPECIALTY LIST ADULT
CT Surgery
CT Surgery
Critical Care
CT Surgery
Critical Care
Intervent Cardiology

## 2018-09-04 NOTE — DISCHARGE NOTE ADULT - CARE PROVIDER_API CALL
Guido Raya (MD), Cardiovascular Surgery  [address]  4th Floor  [place], [place] [address]  Phone: [phone]  Fax: [phone]

## 2018-09-05 LAB — SURGICAL PATHOLOGY STUDY: SIGNIFICANT CHANGE UP

## 2018-09-06 VITALS — BODY MASS INDEX: 23.37 KG/M2 | HEIGHT: 59.84 IN | WEIGHT: 119.05 LBS

## 2018-09-06 PROBLEM — Z86.79 HISTORY OF HYPERTENSION: Status: RESOLVED | Noted: 2018-09-06 | Resolved: 2018-09-06

## 2018-09-06 PROBLEM — I34.0 MITRAL INSUFFICIENCY: Status: ACTIVE | Noted: 2018-09-06

## 2018-09-06 PROBLEM — Z86.2 HISTORY OF ANEMIA: Status: RESOLVED | Noted: 2018-09-06 | Resolved: 2018-09-06

## 2018-09-06 PROBLEM — Z09 POSTOP CHECK: Status: ACTIVE | Noted: 2018-09-06

## 2018-09-06 PROBLEM — Z98.890 S/P MITRAL VALVE REPAIR: Status: ACTIVE | Noted: 2018-09-06

## 2018-09-06 PROBLEM — Z87.01 HISTORY OF PNEUMONIA: Status: RESOLVED | Noted: 2018-09-06 | Resolved: 2018-09-06

## 2018-09-06 PROCEDURE — 82962 GLUCOSE BLOOD TEST: CPT

## 2018-09-06 PROCEDURE — 80048 BASIC METABOLIC PNL TOTAL CA: CPT

## 2018-09-06 PROCEDURE — 84295 ASSAY OF SERUM SODIUM: CPT

## 2018-09-06 PROCEDURE — C9248: CPT

## 2018-09-06 PROCEDURE — 82330 ASSAY OF CALCIUM: CPT

## 2018-09-06 PROCEDURE — 85610 PROTHROMBIN TIME: CPT

## 2018-09-06 PROCEDURE — 84484 ASSAY OF TROPONIN QUANT: CPT

## 2018-09-06 PROCEDURE — 84100 ASSAY OF PHOSPHORUS: CPT

## 2018-09-06 PROCEDURE — 83735 ASSAY OF MAGNESIUM: CPT

## 2018-09-06 PROCEDURE — 80053 COMPREHEN METABOLIC PANEL: CPT

## 2018-09-06 PROCEDURE — 86850 RBC ANTIBODY SCREEN: CPT

## 2018-09-06 PROCEDURE — 93306 TTE W/DOPPLER COMPLETE: CPT

## 2018-09-06 PROCEDURE — 85027 COMPLETE CBC AUTOMATED: CPT

## 2018-09-06 PROCEDURE — 85730 THROMBOPLASTIN TIME PARTIAL: CPT

## 2018-09-06 PROCEDURE — 71046 X-RAY EXAM CHEST 2 VIEWS: CPT

## 2018-09-06 PROCEDURE — 88305 TISSUE EXAM BY PATHOLOGIST: CPT

## 2018-09-06 PROCEDURE — 85025 COMPLETE CBC W/AUTO DIFF WBC: CPT

## 2018-09-06 PROCEDURE — 84132 ASSAY OF SERUM POTASSIUM: CPT

## 2018-09-06 PROCEDURE — P9045: CPT

## 2018-09-06 PROCEDURE — 94150 VITAL CAPACITY TEST: CPT

## 2018-09-06 PROCEDURE — C1894: CPT

## 2018-09-06 PROCEDURE — 83605 ASSAY OF LACTIC ACID: CPT

## 2018-09-06 PROCEDURE — 86923 COMPATIBILITY TEST ELECTRIC: CPT

## 2018-09-06 PROCEDURE — 93005 ELECTROCARDIOGRAM TRACING: CPT

## 2018-09-06 PROCEDURE — 97161 PT EVAL LOW COMPLEX 20 MIN: CPT

## 2018-09-06 PROCEDURE — 87040 BLOOD CULTURE FOR BACTERIA: CPT

## 2018-09-06 PROCEDURE — 97116 GAIT TRAINING THERAPY: CPT

## 2018-09-06 PROCEDURE — 99285 EMERGENCY DEPT VISIT HI MDM: CPT

## 2018-09-06 PROCEDURE — 36415 COLL VENOUS BLD VENIPUNCTURE: CPT

## 2018-09-06 PROCEDURE — C1889: CPT

## 2018-09-06 PROCEDURE — C1887: CPT

## 2018-09-06 PROCEDURE — 86900 BLOOD TYPING SEROLOGIC ABO: CPT

## 2018-09-06 PROCEDURE — 83880 ASSAY OF NATRIURETIC PEPTIDE: CPT

## 2018-09-06 PROCEDURE — 82803 BLOOD GASES ANY COMBINATION: CPT

## 2018-09-06 PROCEDURE — 83036 HEMOGLOBIN GLYCOSYLATED A1C: CPT

## 2018-09-06 PROCEDURE — 86901 BLOOD TYPING SEROLOGIC RH(D): CPT

## 2018-09-06 PROCEDURE — C1769: CPT

## 2018-09-06 PROCEDURE — 71045 X-RAY EXAM CHEST 1 VIEW: CPT

## 2018-09-06 PROCEDURE — 80061 LIPID PANEL: CPT

## 2018-09-06 RX ORDER — FUROSEMIDE 20 MG/1
20 TABLET ORAL DAILY
Refills: 0 | Status: ACTIVE | COMMUNITY

## 2018-09-06 RX ORDER — SIMVASTATIN 20 MG/1
20 TABLET, FILM COATED ORAL
Qty: 30 | Refills: 3 | Status: ACTIVE | COMMUNITY

## 2018-09-06 RX ORDER — ASPIRIN 81 MG
81 TABLET, DELAYED RELEASE (ENTERIC COATED) ORAL DAILY
Refills: 6 | Status: ACTIVE | COMMUNITY

## 2018-09-06 RX ORDER — POTASSIUM CHLORIDE 750 MG/1
10 CAPSULE, EXTENDED RELEASE ORAL
Refills: 0 | Status: ACTIVE | COMMUNITY

## 2018-09-06 RX ORDER — METOPROLOL SUCCINATE 25 MG/1
25 TABLET, EXTENDED RELEASE ORAL
Refills: 0 | Status: ACTIVE | COMMUNITY

## 2018-09-10 ENCOUNTER — FORM ENCOUNTER (OUTPATIENT)
Age: 68
End: 2018-09-10

## 2018-09-11 ENCOUNTER — APPOINTMENT (OUTPATIENT)
Dept: CARDIOTHORACIC SURGERY | Facility: CLINIC | Age: 68
End: 2018-09-11
Payer: MEDICARE

## 2018-09-11 ENCOUNTER — OUTPATIENT (OUTPATIENT)
Dept: OUTPATIENT SERVICES | Facility: HOSPITAL | Age: 68
LOS: 1 days | End: 2018-09-11
Payer: MEDICARE

## 2018-09-11 VITALS
TEMPERATURE: 97.8 F | WEIGHT: 100 LBS | SYSTOLIC BLOOD PRESSURE: 127 MMHG | RESPIRATION RATE: 19 BRPM | BODY MASS INDEX: 19.63 KG/M2 | HEART RATE: 84 BPM | DIASTOLIC BLOOD PRESSURE: 63 MMHG | OXYGEN SATURATION: 97 %

## 2018-09-11 DIAGNOSIS — Z86.2 PERSONAL HISTORY OF DISEASES OF THE BLOOD AND BLOOD-FORMING ORGANS AND CERTAIN DISORDERS INVOLVING THE IMMUNE MECHANISM: ICD-10-CM

## 2018-09-11 DIAGNOSIS — Z86.79 PERSONAL HISTORY OF OTHER DISEASES OF THE CIRCULATORY SYSTEM: ICD-10-CM

## 2018-09-11 DIAGNOSIS — I27.20 PULMONARY HYPERTENSION, UNSPECIFIED: ICD-10-CM

## 2018-09-11 DIAGNOSIS — I37.1 NONRHEUMATIC PULMONARY VALVE INSUFFICIENCY: ICD-10-CM

## 2018-09-11 DIAGNOSIS — D69.6 THROMBOCYTOPENIA, UNSPECIFIED: ICD-10-CM

## 2018-09-11 DIAGNOSIS — I34.0 NONRHEUMATIC MITRAL (VALVE) INSUFFICIENCY: ICD-10-CM

## 2018-09-11 DIAGNOSIS — I50.43 ACUTE ON CHRONIC COMBINED SYSTOLIC (CONGESTIVE) AND DIASTOLIC (CONGESTIVE) HEART FAILURE: ICD-10-CM

## 2018-09-11 DIAGNOSIS — I25.10 ATHEROSCLEROTIC HEART DISEASE OF NATIVE CORONARY ARTERY WITHOUT ANGINA PECTORIS: ICD-10-CM

## 2018-09-11 DIAGNOSIS — I11.0 HYPERTENSIVE HEART DISEASE WITH HEART FAILURE: ICD-10-CM

## 2018-09-11 DIAGNOSIS — D62 ACUTE POSTHEMORRHAGIC ANEMIA: ICD-10-CM

## 2018-09-11 DIAGNOSIS — Z09 ENCOUNTER FOR FOLLOW-UP EXAMINATION AFTER COMPLETED TREATMENT FOR CONDITIONS OTHER THAN MALIGNANT NEOPLASM: ICD-10-CM

## 2018-09-11 DIAGNOSIS — I36.1 NONRHEUMATIC TRICUSPID (VALVE) INSUFFICIENCY: ICD-10-CM

## 2018-09-11 DIAGNOSIS — Z87.01 PERSONAL HISTORY OF PNEUMONIA (RECURRENT): ICD-10-CM

## 2018-09-11 DIAGNOSIS — Z98.890 OTHER SPECIFIED POSTPROCEDURAL STATES: ICD-10-CM

## 2018-09-11 PROCEDURE — 99024 POSTOP FOLLOW-UP VISIT: CPT

## 2018-09-11 PROCEDURE — 71046 X-RAY EXAM CHEST 2 VIEWS: CPT | Mod: 26

## 2018-09-11 PROCEDURE — 71046 X-RAY EXAM CHEST 2 VIEWS: CPT

## 2018-11-08 ENCOUNTER — MESSAGE (OUTPATIENT)
Age: 68
End: 2018-11-08

## 2019-04-02 ENCOUNTER — APPOINTMENT (OUTPATIENT)
Dept: CARDIOTHORACIC SURGERY | Facility: CLINIC | Age: 69
End: 2019-04-02
Payer: MEDICARE

## 2019-04-02 VITALS
OXYGEN SATURATION: 97 % | WEIGHT: 100 LBS | SYSTOLIC BLOOD PRESSURE: 157 MMHG | RESPIRATION RATE: 18 BRPM | HEART RATE: 66 BPM | BODY MASS INDEX: 19.63 KG/M2 | DIASTOLIC BLOOD PRESSURE: 75 MMHG | TEMPERATURE: 97.4 F

## 2019-04-02 PROCEDURE — 99213 OFFICE O/P EST LOW 20 MIN: CPT

## 2019-04-03 NOTE — ASSESSMENT
[FreeTextEntry1] : 67 yo female status post Minimally invasive MV repair w/Physio Ring on 8/31/18 for 6 month follow up visit.\par \par Plan: \par continue current medications\par follow up with Dr. Smith\par counseled re need for antibiotics prior to dental and surgical procedures\par DC from CTS service

## 2019-04-03 NOTE — CONSULT LETTER
[Dear  ___] : Dear  [unfilled], [Please see my note below.] : Please see my note below. [Sincerely,] : Sincerely, [FreeTextEntry2] : Dr. Osmel Smith\par [address]\[place] [address]\par  [FreeTextEntry1] : DOMINICK PATEL was seen today for a 6 month follow up visit. She is doing well status post Minimally invasive MV repair w/Physio Ring on August 31, 2018. We have asked that the patient followup in your office. We have discharged her from our service today. Please contact our office for any questions or concerns at [phone].\par \par Thank you for allowing us to participate in this patients care.\par  [FreeTextEntry3] : Guido Raya MD, FRCS\par \par [facility] \par Department of Cardiothoracic  Surgery \par Director of Robotic Cardiac Surgery\par Professor of Cardiovascular & Thoracic Surgery\par [employer] \par \par [facility]\par

## 2019-04-03 NOTE — HISTORY OF PRESENT ILLNESS
[FreeTextEntry1] : 69 yo female status post Minimally invasive MV repair w/Physio Ring on 8/31/18 for 6 month follow up visit.  She appears generally well, denies c/o chest pain, sob/mancera, fever, lower extremity edema or palpitations. She denies hospitalizations or additional cardiac interventions. NYHA 1.\par

## 2019-04-03 NOTE — PHYSICAL EXAM
[Sclera] : the sclera and conjunctiva were normal [PERRL With Normal Accommodation] : pupils were equal in size, round, and reactive to light [Extraocular Movements] : extraocular movements were intact [Neck Appearance] : the appearance of the neck was normal [Neck Cervical Mass (___cm)] : no neck mass was observed [Jugular Venous Distention Increased] : there was no jugular-venous distention [Thyroid Diffuse Enlargement] : the thyroid was not enlarged [Thyroid Nodule] : there were no palpable thyroid nodules [Auscultation Breath Sounds / Voice Sounds] : lungs were clear to auscultation bilaterally [Heart Rate And Rhythm] : heart rate was normal and rhythm regular [Heart Sounds] : normal S1 and S2 [Heart Sounds Gallop] : no gallops [Murmurs] : no murmurs [Heart Sounds Pericardial Friction Rub] : no pericardial rub [2+] : left 2+ [No Abnormalities] : the abdominal aorta was not enlarged and no bruit was heard [Bowel Sounds] : normal bowel sounds [Abdomen Soft] : soft [Abdomen Tenderness] : non-tender [Abdomen Mass (___ Cm)] : no abdominal mass palpated [No CVA Tenderness] : no ~M costovertebral angle tenderness [No Spinal Tenderness] : no spinal tenderness [Abnormal Walk] : normal gait [Nail Clubbing] : no clubbing  or cyanosis of the fingernails [Musculoskeletal - Swelling] : no joint swelling seen [Motor Tone] : muscle strength and tone were normal [Skin Color & Pigmentation] : normal skin color and pigmentation [Skin Turgor] : normal skin turgor [] : no rash [Deep Tendon Reflexes (DTR)] : deep tendon reflexes were 2+ and symmetric [Sensation] : the sensory exam was normal to light touch and pinprick [No Focal Deficits] : no focal deficits [Oriented To Time, Place, And Person] : oriented to person, place, and time [Impaired Insight] : insight and judgment were intact [Affect] : the affect was normal [FreeTextEntry1] : right ant mini thoracotomy incision well healed [Right Carotid Bruit] : no bruit heard over the right carotid [Left Carotid Bruit] : no bruit heard over the left carotid [Right Femoral Bruit] : no bruit heard over the right femoral artery [Left Femoral Bruit] : no bruit heard over the left femoral artery

## 2021-04-15 NOTE — ED PROVIDER NOTE - NS ED MD DISPO SPECIAL CONSIDERATION1
None Detail Level: Zone Render Risk Assessment In Note?: no Recommendation Preamble: The following recommendations were made during the visit: Recommendations (Free Text): Will address acne scarring once Isotretinoin course is almost complete.